# Patient Record
Sex: FEMALE | Race: WHITE | Employment: OTHER | URBAN - METROPOLITAN AREA
[De-identification: names, ages, dates, MRNs, and addresses within clinical notes are randomized per-mention and may not be internally consistent; named-entity substitution may affect disease eponyms.]

---

## 2021-09-11 ENCOUNTER — HOSPITAL ENCOUNTER (EMERGENCY)
Age: 76
Discharge: HOME | End: 2021-09-11
Attending: EMERGENCY MEDICINE | Admitting: EMERGENCY MEDICINE
Payer: MEDICARE

## 2021-09-11 ENCOUNTER — APPOINTMENT (OUTPATIENT)
Dept: CT IMAGING | Age: 76
End: 2021-09-11
Attending: PHYSICIAN ASSISTANT
Payer: MEDICARE

## 2021-09-11 VITALS
TEMPERATURE: 98.1 F | OXYGEN SATURATION: 97 % | RESPIRATION RATE: 16 BRPM | SYSTOLIC BLOOD PRESSURE: 164 MMHG | HEART RATE: 68 BPM | DIASTOLIC BLOOD PRESSURE: 77 MMHG
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
## 2021-09-11 PROCEDURE — 74011250637 HC RX REV CODE- 250/637: Performed by: PHYSICIAN ASSISTANT

## 2021-09-11 PROCEDURE — 99283 EMERGENCY DEPT VISIT LOW MDM: CPT

## 2021-09-11 PROCEDURE — 74011000250 HC RX REV CODE- 250: Performed by: PHYSICIAN ASSISTANT

## 2021-09-11 PROCEDURE — 70486 CT MAXILLOFACIAL W/O DYE: CPT

## 2021-09-11 PROCEDURE — 75810000293 HC SIMP/SUPERF WND  RPR

## 2021-09-11 RX ORDER — BACITRACIN 500 UNIT/G
1 PACKET (EA) TOPICAL
Status: COMPLETED | OUTPATIENT
Start: 2021-09-11 | End: 2021-09-11

## 2021-09-11 RX ORDER — LIDOCAINE HYDROCHLORIDE AND EPINEPHRINE 10; 10 MG/ML; UG/ML
10 INJECTION, SOLUTION INFILTRATION; PERINEURAL ONCE
Status: COMPLETED | OUTPATIENT
Start: 2021-09-11 | End: 2021-09-11

## 2021-09-11 RX ORDER — AMOXICILLIN AND CLAVULANATE POTASSIUM 875; 125 MG/1; MG/1
1 TABLET, FILM COATED ORAL 2 TIMES DAILY
Qty: 14 TABLET | Refills: 0 | Status: SHIPPED | OUTPATIENT
Start: 2021-09-11 | End: 2021-09-18

## 2021-09-11 RX ORDER — IBUPROFEN 400 MG/1
800 TABLET ORAL
Status: COMPLETED | OUTPATIENT
Start: 2021-09-11 | End: 2021-09-11

## 2021-09-11 RX ORDER — AMOXICILLIN AND CLAVULANATE POTASSIUM 875; 125 MG/1; MG/1
1 TABLET, FILM COATED ORAL
Status: COMPLETED | OUTPATIENT
Start: 2021-09-11 | End: 2021-09-11

## 2021-09-11 RX ADMIN — BACITRACIN 1 PACKET: 500 OINTMENT TOPICAL at 13:46

## 2021-09-11 RX ADMIN — LIDOCAINE HYDROCHLORIDE AND EPINEPHRINE 100 MG: 10; 10 INJECTION, SOLUTION INFILTRATION; PERINEURAL at 13:46

## 2021-09-11 RX ADMIN — AMOXICILLIN AND CLAVULANATE POTASSIUM 1 TABLET: 875; 125 TABLET, FILM COATED ORAL at 13:46

## 2021-09-11 RX ADMIN — IBUPROFEN 800 MG: 400 TABLET, FILM COATED ORAL at 15:18

## 2021-09-11 NOTE — ED TRIAGE NOTES
Triage: Pt arrives ambulatory from home with CC of dog bite to her face. Her neighbors dog bit her while she was repairing her fence. The dog is up to date on its rabies vaccination.

## 2021-09-11 NOTE — DISCHARGE INSTRUCTIONS
Return for new or worsening symptoms. Call ENT on Monday to make a follow up appt - explain that you were seen in the ED, you had a dog bite to your face and you have have an injury to your parotid gland. Keep the wound dry for the first 24-48 hours. After that it is okay for the wound to get wet, but do not soak it for extended periods of time. Wash the wound 1-2 times a day with warm water and gentle soap. Apply an antibiotic ointment such as Neosporin or Bacitracin, or plain petroleum jelly (Vaseline). Keep covered until healed if able to. Watch for any signs of infection, including fever/chills, redness, pain, swelling, or drainage from the wound, and seek medical attention if necessary. Sutures should be removed in 5-6 days.

## 2021-09-11 NOTE — ED PROVIDER NOTES
70year old female presenting to the ED for a dog bite. Notes that she was in her yard, repairing her fence, when the next door neighbor's dog lunged over the fence and bit her face. Notes moderately severe pain with palpation at the site of mutliple lacerations. Dog is UTD. No meds PTA. No other concerns. PMHx: hypothyroid  PSx: tubal ligation  Social: non-smoker. Rare alcohol. Retired [employer] . Allergies: keflex  Tetanus: within 5 years    The history is provided by the patient. Dog Bite   Pertinent negatives include no chest pain and no vomiting. No past medical history on file. No past surgical history on file. No family history on file. Social History     Socioeconomic History    Marital status:      Spouse name: Not on file    Number of children: Not on file    Years of education: Not on file    Highest education level: Not on file   Occupational History    Not on file   Tobacco Use    Smoking status: Not on file   Substance and Sexual Activity    Alcohol use: Not on file    Drug use: Not on file    Sexual activity: Not on file   Other Topics Concern    Not on file   Social History Narrative    Not on file     Social Determinants of Health     Financial Resource Strain:     Difficulty of Paying Living Expenses:    Food Insecurity:     Worried About Running Out of Food in the Last Year:     [address] in the Last Year:    Transportation Needs:     Lack of Transportation (Medical):      Lack of Transportation (Non-Medical):    Physical Activity:     Days of Exercise per Week:     Minutes of Exercise per Session:    Stress:     Feeling of Stress :    Social Connections:     Frequency of Communication with Friends and Family:     Frequency of Social Gatherings with Friends and Family:     Attends [employer] orth[employer] Services:     Active Member of Clubs or Organizations:     Attends Club or Organization Meetings:     Marital Status: Intimate Partner Violence:     Fear of Current or Ex-Partner:     Emotionally Abused:     Physically Abused:     Sexually Abused: ALLERGIES: Keflex [cephalexin]    Review of Systems   Constitutional: Negative for fever. HENT: Negative for facial swelling. Respiratory: Negative for shortness of breath. Cardiovascular: Negative for chest pain. Gastrointestinal: Negative for vomiting. Skin: Positive for wound. Neurological: Negative for syncope. All other systems reviewed and are negative. Vitals:    09/11/21 1257   BP: (!) 164/77   Pulse: 68   Resp: 16   Temp: 98.1 °F (36.7 °C)   SpO2: 97%            Physical Exam  Vitals and nursing note reviewed. Constitutional:       General: She is not in acute distress. Appearance: She is well-developed. Comments: Pleasant elderly WF   HENT:      Head: Normocephalic and atraumatic. Comments: 2cm preauricular region, 2.5cm over the mandible, 0.24cm laceration on the ear lobe     Right Ear: External ear normal.      Left Ear: External ear normal.   Eyes:      General: No scleral icterus. Conjunctiva/sclera: Conjunctivae normal.   Neck:      Trachea: No tracheal deviation. Cardiovascular:      Rate and Rhythm: Normal rate and regular rhythm. Heart sounds: Normal heart sounds. No murmur heard. No friction rub. No gallop. Pulmonary:      Effort: Pulmonary effort is normal. No respiratory distress. Breath sounds: Normal breath sounds. No stridor. No wheezing. Abdominal:      General: There is no distension. Palpations: Abdomen is soft. Musculoskeletal:         General: Normal range of motion. Cervical back: Neck supple. Skin:     General: Skin is warm and dry. Neurological:      Mental Status: She is alert and oriented to person, place, and time.    Psychiatric:         Behavior: Behavior normal.          MDM  Number of Diagnoses or Management Options  Dog bite, initial encounter  Diagnosis management comments: 70year old F presenting for dog bite to the face. Neighbor's dog, UTD. Wound copiously irrigated and explored. Given degree of pain, CT ordered, possible injury to the parotid gland noted - discussed with ED attending, agrees with ENT follow up, discussed with patient, voiced understanding of f/u instructions and return precautions. Rx augmentin. Amount and/or Complexity of Data Reviewed  Tests in the radiology section of CPT®: ordered and reviewed  Discuss the patient with other providers: yes (Dr. Johanna Tran and Dr. Mary Segundo, ED attendings)  Independent visualization of images, tracings, or specimens: yes (CT)           Wound Repair    Date/Time: 9/11/2021 2:32 PM  Performed by: Nyasia Calles provider: Dr. Johanna Tran  Preparation: skin prepped with Shur-Clens  Location details: face  Wound length:2.6 - 7.5 cm (total: 5.5)  Anesthesia: local infiltration    Anesthesia:  Local Anesthetic: lidocaine 1% with epinephrine  Anesthetic total: 6 mL  Foreign bodies: no foreign bodies  Irrigation solution: saline  Irrigation method: syringe  Debridement: none  Skin closure: 6-0 nylon  Number of sutures: 5  Technique: simple and interrupted  Approximation: close  Dressing: antibiotic ointment  Patient tolerance: patient tolerated the procedure well with no immediate complications  My total time at bedside, performing this procedure was 16-30 minutes. Comments: 3 sutures placed over mandile, 2 in preauricular area. Wounds irrigated with about 1/2L NS and explored, extend into the subcutaneous fat but not deeper.   Tiny wound on ear lobe already healing, no need for suture placement

## 2022-03-30 ENCOUNTER — HOSPITAL ENCOUNTER (INPATIENT)
Age: 77
LOS: 3 days | Discharge: HOME | DRG: 337 | End: 2022-04-02
Attending: EMERGENCY MEDICINE | Admitting: SURGERY
Payer: MEDICARE

## 2022-03-30 ENCOUNTER — APPOINTMENT (OUTPATIENT)
Dept: CT IMAGING | Age: 77
DRG: 337 | End: 2022-03-30
Attending: EMERGENCY MEDICINE
Payer: MEDICARE

## 2022-03-30 DIAGNOSIS — K56.609 SBO (SMALL BOWEL OBSTRUCTION) (HCC): ICD-10-CM

## 2022-03-30 DIAGNOSIS — K56.609 SMALL BOWEL OBSTRUCTION (HCC): Primary | ICD-10-CM

## 2022-03-30 LAB
ALBUMIN SERPL-MCNC: 3.8 G/DL (ref 3.5–5)
ALBUMIN/GLOB SERPL: 1 {RATIO} (ref 1.1–2.2)
ALP SERPL-CCNC: 55 U/L (ref 45–117)
ALT SERPL-CCNC: 28 U/L (ref 12–78)
ANION GAP SERPL CALC-SCNC: 6 MMOL/L (ref 5–15)
APPEARANCE UR: ABNORMAL
AST SERPL-CCNC: 34 U/L (ref 15–37)
BACTERIA URNS QL MICRO: NEGATIVE /HPF
BASOPHILS # BLD: 0 K/UL (ref 0–0.1)
BASOPHILS NFR BLD: 1 % (ref 0–1)
BILIRUB SERPL-MCNC: 0.5 MG/DL (ref 0.2–1)
BILIRUB UR QL: NEGATIVE
BUN SERPL-MCNC: 17 MG/DL (ref 6–20)
BUN/CREAT SERPL: 22 (ref 12–20)
CALCIUM SERPL-MCNC: 9 MG/DL (ref 8.5–10.1)
CHLORIDE SERPL-SCNC: 102 MMOL/L (ref 97–108)
CO2 SERPL-SCNC: 27 MMOL/L (ref 21–32)
COLOR UR: ABNORMAL
COMMENT, HOLDF: NORMAL
CREAT SERPL-MCNC: 0.77 MG/DL (ref 0.55–1.02)
DIFFERENTIAL METHOD BLD: NORMAL
EOSINOPHIL # BLD: 0.1 K/UL (ref 0–0.4)
EOSINOPHIL NFR BLD: 2 % (ref 0–7)
EPITH CASTS URNS QL MICRO: ABNORMAL /LPF
ERYTHROCYTE [DISTWIDTH] IN BLOOD BY AUTOMATED COUNT: 13 % (ref 11.5–14.5)
GLOBULIN SER CALC-MCNC: 3.7 G/DL (ref 2–4)
GLUCOSE SERPL-MCNC: 135 MG/DL (ref 65–100)
GLUCOSE UR STRIP.AUTO-MCNC: NEGATIVE MG/DL
HCT VFR BLD AUTO: 40.6 % (ref 35–47)
HGB BLD-MCNC: 13.5 G/DL (ref 11.5–16)
HGB UR QL STRIP: NEGATIVE
HYALINE CASTS URNS QL MICRO: ABNORMAL /LPF (ref 0–5)
IMM GRANULOCYTES # BLD AUTO: 0 K/UL (ref 0–0.04)
IMM GRANULOCYTES NFR BLD AUTO: 0 % (ref 0–0.5)
KETONES UR QL STRIP.AUTO: NEGATIVE MG/DL
LEUKOCYTE ESTERASE UR QL STRIP.AUTO: NEGATIVE
LYMPHOCYTES # BLD: 1.4 K/UL (ref 0.8–3.5)
LYMPHOCYTES NFR BLD: 30 % (ref 12–49)
MCH RBC QN AUTO: 31.5 PG (ref 26–34)
MCHC RBC AUTO-ENTMCNC: 33.3 G/DL (ref 30–36.5)
MCV RBC AUTO: 94.9 FL (ref 80–99)
MONOCYTES # BLD: 0.5 K/UL (ref 0–1)
MONOCYTES NFR BLD: 11 % (ref 5–13)
NEUTS SEG # BLD: 2.6 K/UL (ref 1.8–8)
NEUTS SEG NFR BLD: 56 % (ref 32–75)
NITRITE UR QL STRIP.AUTO: NEGATIVE
NRBC # BLD: 0 K/UL (ref 0–0.01)
NRBC BLD-RTO: 0 PER 100 WBC
PH UR STRIP: 8 [PH] (ref 5–8)
PLATELET # BLD AUTO: 328 K/UL (ref 150–400)
PMV BLD AUTO: 9.8 FL (ref 8.9–12.9)
POTASSIUM SERPL-SCNC: 4.3 MMOL/L (ref 3.5–5.1)
PROT SERPL-MCNC: 7.5 G/DL (ref 6.4–8.2)
PROT UR STRIP-MCNC: NEGATIVE MG/DL
RBC # BLD AUTO: 4.28 M/UL (ref 3.8–5.2)
RBC #/AREA URNS HPF: ABNORMAL /HPF (ref 0–5)
SAMPLES BEING HELD,HOLD: NORMAL
SODIUM SERPL-SCNC: 135 MMOL/L (ref 136–145)
SP GR UR REFRACTOMETRY: 1.02 (ref 1–1.03)
UR CULT HOLD, URHOLD: NORMAL
UROBILINOGEN UR QL STRIP.AUTO: 0.2 EU/DL (ref 0.2–1)
WBC # BLD AUTO: 4.6 K/UL (ref 3.6–11)
WBC URNS QL MICRO: ABNORMAL /HPF (ref 0–4)

## 2022-03-30 PROCEDURE — 81001 URINALYSIS AUTO W/SCOPE: CPT

## 2022-03-30 PROCEDURE — 96374 THER/PROPH/DIAG INJ IV PUSH: CPT

## 2022-03-30 PROCEDURE — 74011000250 HC RX REV CODE- 250: Performed by: EMERGENCY MEDICINE

## 2022-03-30 PROCEDURE — C9113 INJ PANTOPRAZOLE SODIUM, VIA: HCPCS | Performed by: NURSE PRACTITIONER

## 2022-03-30 PROCEDURE — 65270000029 HC RM PRIVATE

## 2022-03-30 PROCEDURE — 36415 COLL VENOUS BLD VENIPUNCTURE: CPT

## 2022-03-30 PROCEDURE — 85025 COMPLETE CBC W/AUTO DIFF WBC: CPT

## 2022-03-30 PROCEDURE — 80053 COMPREHEN METABOLIC PANEL: CPT

## 2022-03-30 PROCEDURE — 74176 CT ABD & PELVIS W/O CONTRAST: CPT

## 2022-03-30 PROCEDURE — 74011000250 HC RX REV CODE- 250: Performed by: NURSE PRACTITIONER

## 2022-03-30 PROCEDURE — 74011250636 HC RX REV CODE- 250/636: Performed by: NURSE PRACTITIONER

## 2022-03-30 PROCEDURE — 96375 TX/PRO/DX INJ NEW DRUG ADDON: CPT

## 2022-03-30 PROCEDURE — 99221 1ST HOSP IP/OBS SF/LOW 40: CPT | Performed by: NURSE PRACTITIONER

## 2022-03-30 PROCEDURE — 74011250636 HC RX REV CODE- 250/636: Performed by: EMERGENCY MEDICINE

## 2022-03-30 PROCEDURE — 99285 EMERGENCY DEPT VISIT HI MDM: CPT

## 2022-03-30 RX ORDER — SODIUM CHLORIDE 0.9 % (FLUSH) 0.9 %
5-40 SYRINGE (ML) INJECTION EVERY 8 HOURS
Status: DISCONTINUED | OUTPATIENT
Start: 2022-03-30 | End: 2022-03-30

## 2022-03-30 RX ORDER — PANTOPRAZOLE SODIUM 40 MG/10ML
40 INJECTION, POWDER, LYOPHILIZED, FOR SOLUTION INTRAVENOUS DAILY
Status: DISCONTINUED | OUTPATIENT
Start: 2022-03-30 | End: 2022-04-02 | Stop reason: HOSPADM

## 2022-03-30 RX ORDER — SODIUM CHLORIDE 0.9 % (FLUSH) 0.9 %
5-40 SYRINGE (ML) INJECTION AS NEEDED
Status: DISCONTINUED | OUTPATIENT
Start: 2022-03-30 | End: 2022-03-30

## 2022-03-30 RX ORDER — KETOROLAC TROMETHAMINE 30 MG/ML
15 INJECTION, SOLUTION INTRAMUSCULAR; INTRAVENOUS
Status: COMPLETED | OUTPATIENT
Start: 2022-03-30 | End: 2022-03-30

## 2022-03-30 RX ORDER — SODIUM CHLORIDE 0.9 % (FLUSH) 0.9 %
10 SYRINGE (ML) INJECTION DAILY
Status: DISCONTINUED | OUTPATIENT
Start: 2022-03-30 | End: 2022-04-02 | Stop reason: HOSPADM

## 2022-03-30 RX ORDER — ONDANSETRON 2 MG/ML
4 INJECTION INTRAMUSCULAR; INTRAVENOUS
Status: COMPLETED | OUTPATIENT
Start: 2022-03-30 | End: 2022-03-30

## 2022-03-30 RX ORDER — HYDROMORPHONE HYDROCHLORIDE 1 MG/ML
0.5 INJECTION, SOLUTION INTRAMUSCULAR; INTRAVENOUS; SUBCUTANEOUS
Status: DISCONTINUED | OUTPATIENT
Start: 2022-03-30 | End: 2022-03-31

## 2022-03-30 RX ORDER — SODIUM CHLORIDE 9 MG/ML
125 INJECTION, SOLUTION INTRAVENOUS CONTINUOUS
Status: DISCONTINUED | OUTPATIENT
Start: 2022-03-30 | End: 2022-04-01

## 2022-03-30 RX ORDER — ONDANSETRON 2 MG/ML
4 INJECTION INTRAMUSCULAR; INTRAVENOUS
Status: DISCONTINUED | OUTPATIENT
Start: 2022-03-30 | End: 2022-04-02 | Stop reason: HOSPADM

## 2022-03-30 RX ORDER — LEVOTHYROXINE SODIUM 112 UG/1
112 TABLET ORAL DAILY
COMMUNITY

## 2022-03-30 RX ADMIN — KETOROLAC TROMETHAMINE 15 MG: 30 INJECTION, SOLUTION INTRAMUSCULAR; INTRAVENOUS at 11:23

## 2022-03-30 RX ADMIN — HYDROMORPHONE HYDROCHLORIDE 0.5 MG: 1 INJECTION, SOLUTION INTRAMUSCULAR; INTRAVENOUS; SUBCUTANEOUS at 23:39

## 2022-03-30 RX ADMIN — HYDROMORPHONE HYDROCHLORIDE 0.5 MG: 1 INJECTION, SOLUTION INTRAMUSCULAR; INTRAVENOUS; SUBCUTANEOUS at 19:07

## 2022-03-30 RX ADMIN — SODIUM CHLORIDE, PRESERVATIVE FREE 10 ML: 5 INJECTION INTRAVENOUS at 15:18

## 2022-03-30 RX ADMIN — HYDROMORPHONE HYDROCHLORIDE 0.5 MG: 1 INJECTION, SOLUTION INTRAMUSCULAR; INTRAVENOUS; SUBCUTANEOUS at 15:07

## 2022-03-30 RX ADMIN — ONDANSETRON 4 MG: 2 INJECTION INTRAMUSCULAR; INTRAVENOUS at 11:23

## 2022-03-30 RX ADMIN — SODIUM CHLORIDE, PRESERVATIVE FREE 10 ML: 5 INJECTION INTRAVENOUS at 15:07

## 2022-03-30 RX ADMIN — SODIUM CHLORIDE 1000 ML: 9 INJECTION, SOLUTION INTRAVENOUS at 11:23

## 2022-03-30 RX ADMIN — ONDANSETRON 4 MG: 2 INJECTION INTRAMUSCULAR; INTRAVENOUS at 15:16

## 2022-03-30 RX ADMIN — SODIUM CHLORIDE 125 ML/HR: 9 INJECTION, SOLUTION INTRAVENOUS at 17:21

## 2022-03-30 RX ADMIN — PANTOPRAZOLE SODIUM 40 MG: 40 INJECTION, POWDER, FOR SOLUTION INTRAVENOUS at 15:16

## 2022-03-30 RX ADMIN — ONDANSETRON 4 MG: 2 INJECTION INTRAMUSCULAR; INTRAVENOUS at 19:07

## 2022-03-30 NOTE — ED TRIAGE NOTES
PT reports LLQ pain that now radiates to her back and her upper abdomen x1 hour. Pt also reports nausea. Pt denies v/d. Pt took some paulo seltzer with no relief.

## 2022-03-30 NOTE — CONSULTS
General Surgery ER Consultation    Admit Date: 3/30/2022  Reason for Consultation: SBO    HPI:  Roz Red is a 78 y.o. female w/ surgical hx of tubal ligation presents to the ED w/ c/o sudden onset of LLQ abd pain that started early this am a/w nausea. The pain radiates through to her back and under her ribs on L side. No vomiting. No fevers. She tried paulo seltzer and it didn't help. CT shows   localized small bowel distention in the left lower quadrant is concerning for  focal small bowel obstruction possibly related to adhesion or internal hernia. Patient Active Problem List    Diagnosis Date Noted    SBO (small bowel obstruction) ([facility] [address] 03/30/2022     Past Medical History:   Diagnosis Date    Hypothyroidism       Past Surgical History:   Procedure Laterality Date    HX TUBAL LIGATION        Social History     Tobacco Use    Smoking status: Never Smoker    Smokeless tobacco: Never Used   Substance Use Topics    Alcohol use: Not Currently      History reviewed. No pertinent family history. Prior to Admission medications    Not on File     Allergies   Allergen Reactions    Keflex [Cephalexin] Other (comments)     Shaking            Subjective:     Review of Systems:    A comprehensive review of systems was negative except for that written in the History of Present Illness. Objective:     Blood pressure (!) 156/75, pulse 61, temperature 98.5 °F (36.9 °C), resp. rate 18, SpO2 96 %.   Recent Results (from the past 24 hour(s))   CBC WITH AUTOMATED DIFF    Collection Time: 03/30/22 10:23 AM   Result Value Ref Range    WBC 4.6 3.6 - 11.0 K/uL    RBC 4.28 3.80 - 5.20 M/uL    HGB 13.5 11.5 - 16.0 g/dL    HCT 40.6 35.0 - 47.0 %    MCV 94.9 80.0 - 99.0 FL    MCH 31.5 26.0 - 34.0 PG    MCHC 33.3 30.0 - 36.5 g/dL    RDW 13.0 11.5 - 14.5 %    PLATELET 879 495 - 396 K/uL    MPV 9.8 8.9 - 12.9 FL    NRBC 0.0 0  WBC    ABSOLUTE NRBC 0.00 0.00 - 0.01 K/uL    NEUTROPHILS 56 32 - 75 %    LYMPHOCYTES 30 12 - 49 %    MONOCYTES 11 5 - 13 %    EOSINOPHILS 2 0 - 7 %    BASOPHILS 1 0 - 1 %    IMMATURE GRANULOCYTES 0 0.0 - 0.5 %    ABS. NEUTROPHILS 2.6 1.8 - 8.0 K/UL    ABS. LYMPHOCYTES 1.4 0.8 - 3.5 K/UL    ABS. MONOCYTES 0.5 0.0 - 1.0 K/UL    ABS. EOSINOPHILS 0.1 0.0 - 0.4 K/UL    ABS. BASOPHILS 0.0 0.0 - 0.1 K/UL    ABS. IMM. GRANS. 0.0 0.00 - 0.04 K/UL    DF AUTOMATED     METABOLIC PANEL, COMPREHENSIVE    Collection Time: 03/30/22 10:23 AM   Result Value Ref Range    Sodium 135 (L) 136 - 145 mmol/L    Potassium 4.3 3.5 - 5.1 mmol/L    Chloride 102 97 - 108 mmol/L    CO2 27 21 - 32 mmol/L    Anion gap 6 5 - 15 mmol/L    Glucose 135 (H) 65 - 100 mg/dL    BUN 17 6 - 20 MG/DL    Creatinine 0.77 0.55 - 1.02 MG/DL    BUN/Creatinine ratio 22 (H) 12 - 20      GFR est AA >60 >60 ml/min/1.73m2    GFR est non-AA >60 >60 ml/min/1.73m2    Calcium 9.0 8.5 - 10.1 MG/DL    Bilirubin, total 0.5 0.2 - 1.0 MG/DL    ALT (SGPT) 28 12 - 78 U/L    AST (SGOT) 34 15 - 37 U/L    Alk. phosphatase 55 45 - 117 U/L    Protein, total 7.5 6.4 - 8.2 g/dL    Albumin 3.8 3.5 - 5.0 g/dL    Globulin 3.7 2.0 - 4.0 g/dL    A-G Ratio 1.0 (L) 1.1 - 2.2     SAMPLES BEING HELD    Collection Time: 03/30/22 10:23 AM   Result Value Ref Range    SAMPLES BEING HELD 1RED     COMMENT        Add-on orders for these samples will be processed based on acceptable specimen integrity and analyte stability, which may vary by analyte.    URINALYSIS W/MICROSCOPIC    Collection Time: 03/30/22 12:31 PM   Result Value Ref Range    Color YELLOW/STRAW      Appearance TURBID (A) CLEAR      Specific gravity 1.018 1.003 - 1.030      pH (UA) 8.0 5.0 - 8.0      Protein Negative NEG mg/dL    Glucose Negative NEG mg/dL    Ketone Negative NEG mg/dL    Bilirubin Negative NEG      Blood Negative NEG      Urobilinogen 0.2 0.2 - 1.0 EU/dL    Nitrites Negative NEG      Leukocyte Esterase Negative NEG      WBC 0-4 0 - 4 /hpf    RBC 0-5 0 - 5 /hpf    Epithelial cells FEW FEW /lpf    Bacteria Negative NEG /hpf    Hyaline cast 0-2 0 - 5 /lpf   URINE CULTURE HOLD SAMPLE    Collection Time: 03/30/22 12:31 PM    Specimen: Serum; Urine   Result Value Ref Range    Urine culture hold        Urine on hold in Microbiology dept for 2 days. If unpreserved urine is submitted, it cannot be used for addtional testing after 24 hours, recollection will be required.     _____________________  Physical Exam:     General:  Alert, cooperative, no distress, appears stated age. Eyes:   Sclera clear. Throat: Lips, mucosa, and tongue normal.   Neck: Supple, symmetrical, trachea midline. Lungs:   Clear to auscultation bilaterally. Heart:  Regular rate and rhythm. Abdomen:   Soft, TTP LLQ, Bowel sounds hypoactive. No masses,  No organomegaly. Extremities: Extremities normal, atraumatic, no cyanosis or edema. Skin: Skin color, texture, turgor normal. No rashes or lesions. Assessment:   Active Problems:    SBO (small bowel obstruction) ([address] (3/30/2022)            Plan:     Admit to Dr Jona Hunt for SBO  IVF  Bowel rest, npo w/ ice chips  Pain and nausea mgmt  PPI  AXR tomorrow    Total time spent with patient: 30 minutes.     Signed By: Tereso Macias NP     March 30, 2022

## 2022-03-30 NOTE — PROGRESS NOTES
Problem: General Medical Care Plan  Goal: *Vital signs within specified parameters  3/30/2022 1734 by Helen Dimas RN  Outcome: Progressing Towards Goal  3/30/2022 1709 by Helen Dimas RN  Outcome: Progressing Towards Goal  Goal: *Labs within defined limits  3/30/2022 1734 by Helen Dimas RN  Outcome: Progressing Towards Goal  3/30/2022 1709 by Helen Dimas RN  Outcome: Progressing Towards Goal  Goal: *Absence of infection signs and symptoms  3/30/2022 1734 by Helen Dimas RN  Outcome: Progressing Towards Goal  3/30/2022 1709 by Helen Dimas RN  Outcome: Progressing Towards Goal  Goal: *Optimal pain control at patient's stated goal  3/30/2022 1734 by Helen Dimas RN  Outcome: Progressing Towards Goal  3/30/2022 1709 by Helen Dimas RN  Outcome: Progressing Towards Goal  Goal: *Skin integrity maintained  3/30/2022 1734 by Helen Dimas RN  Outcome: Progressing Towards Goal  3/30/2022 1709 by Helen Dimas RN  Outcome: Progressing Towards Goal  Goal: *Fluid volume balance  3/30/2022 1734 by Helen Dimas RN  Outcome: Progressing Towards Goal  3/30/2022 1709 by Helen Dimas RN  Outcome: Progressing Towards Goal  Goal: *Optimize nutritional status  3/30/2022 1734 by Helen Dimas RN  Outcome: Progressing Towards Goal  3/30/2022 1709 by Helen Dimas RN  Outcome: Progressing Towards Goal  Goal: *Anxiety reduced or absent  3/30/2022 1734 by Helen Dimas RN  Outcome: Progressing Towards Goal  3/30/2022 1709 by Helen Dimas RN  Outcome: Progressing Towards Goal  Goal: *Progressive mobility and function (eg: ADL's)  3/30/2022 1734 by Helen Dimas RN  Outcome: Progressing Towards Goal  3/30/2022 1709 by Helen Dimas RN  Outcome: Progressing Towards Goal

## 2022-03-30 NOTE — H&P
General Surgery H and P     Admit Date: 3/30/2022  Reason for Consultation: SBO    HPI:  Kenzie Finley is a 78 y.o. female w/ surgical hx of tubal ligation presents to the ED w/ c/o sudden onset of LLQ abd pain that started early this am a/w nausea. The pain radiates through to her back and under her ribs on L side. No vomiting. No fevers. She tried paulo seltzer and it didn't help. CT shows   localized small bowel distention in the left lower quadrant is concerning for  focal small bowel obstruction possibly related to adhesion or internal hernia. Patient Active Problem List    Diagnosis Date Noted    SBO (small bowel obstruction) ([facility] [address] 03/30/2022     Past Medical History:   Diagnosis Date    Hypothyroidism       Past Surgical History:   Procedure Laterality Date    HX TUBAL LIGATION        Social History     Tobacco Use    Smoking status: Never Smoker    Smokeless tobacco: Never Used   Substance Use Topics    Alcohol use: Not Currently      History reviewed. No pertinent family history. Prior to Admission medications    Not on File     Allergies   Allergen Reactions    Keflex [Cephalexin] Other (comments)     Shaking            Subjective:     Review of Systems:    A comprehensive review of systems was negative except for that written in the History of Present Illness. Objective:     Blood pressure (!) 156/75, pulse 61, temperature 98.5 °F (36.9 °C), resp. rate 18, SpO2 96 %.   Recent Results (from the past 24 hour(s))   CBC WITH AUTOMATED DIFF    Collection Time: 03/30/22 10:23 AM   Result Value Ref Range    WBC 4.6 3.6 - 11.0 K/uL    RBC 4.28 3.80 - 5.20 M/uL    HGB 13.5 11.5 - 16.0 g/dL    HCT 40.6 35.0 - 47.0 %    MCV 94.9 80.0 - 99.0 FL    MCH 31.5 26.0 - 34.0 PG    MCHC 33.3 30.0 - 36.5 g/dL    RDW 13.0 11.5 - 14.5 %    PLATELET 637 034 - 955 K/uL    MPV 9.8 8.9 - 12.9 FL    NRBC 0.0 0  WBC    ABSOLUTE NRBC 0.00 0.00 - 0.01 K/uL    NEUTROPHILS 56 32 - 75 % LYMPHOCYTES 30 12 - 49 %    MONOCYTES 11 5 - 13 %    EOSINOPHILS 2 0 - 7 %    BASOPHILS 1 0 - 1 %    IMMATURE GRANULOCYTES 0 0.0 - 0.5 %    ABS. NEUTROPHILS 2.6 1.8 - 8.0 K/UL    ABS. LYMPHOCYTES 1.4 0.8 - 3.5 K/UL    ABS. MONOCYTES 0.5 0.0 - 1.0 K/UL    ABS. EOSINOPHILS 0.1 0.0 - 0.4 K/UL    ABS. BASOPHILS 0.0 0.0 - 0.1 K/UL    ABS. IMM. GRANS. 0.0 0.00 - 0.04 K/UL    DF AUTOMATED     METABOLIC PANEL, COMPREHENSIVE    Collection Time: 03/30/22 10:23 AM   Result Value Ref Range    Sodium 135 (L) 136 - 145 mmol/L    Potassium 4.3 3.5 - 5.1 mmol/L    Chloride 102 97 - 108 mmol/L    CO2 27 21 - 32 mmol/L    Anion gap 6 5 - 15 mmol/L    Glucose 135 (H) 65 - 100 mg/dL    BUN 17 6 - 20 MG/DL    Creatinine 0.77 0.55 - 1.02 MG/DL    BUN/Creatinine ratio 22 (H) 12 - 20      GFR est AA >60 >60 ml/min/1.73m2    GFR est non-AA >60 >60 ml/min/1.73m2    Calcium 9.0 8.5 - 10.1 MG/DL    Bilirubin, total 0.5 0.2 - 1.0 MG/DL    ALT (SGPT) 28 12 - 78 U/L    AST (SGOT) 34 15 - 37 U/L    Alk. phosphatase 55 45 - 117 U/L    Protein, total 7.5 6.4 - 8.2 g/dL    Albumin 3.8 3.5 - 5.0 g/dL    Globulin 3.7 2.0 - 4.0 g/dL    A-G Ratio 1.0 (L) 1.1 - 2.2     SAMPLES BEING HELD    Collection Time: 03/30/22 10:23 AM   Result Value Ref Range    SAMPLES BEING HELD 1RED     COMMENT        Add-on orders for these samples will be processed based on acceptable specimen integrity and analyte stability, which may vary by analyte.    URINALYSIS W/MICROSCOPIC    Collection Time: 03/30/22 12:31 PM   Result Value Ref Range    Color YELLOW/STRAW      Appearance TURBID (A) CLEAR      Specific gravity 1.018 1.003 - 1.030      pH (UA) 8.0 5.0 - 8.0      Protein Negative NEG mg/dL    Glucose Negative NEG mg/dL    Ketone Negative NEG mg/dL    Bilirubin Negative NEG      Blood Negative NEG      Urobilinogen 0.2 0.2 - 1.0 EU/dL    Nitrites Negative NEG      Leukocyte Esterase Negative NEG      WBC 0-4 0 - 4 /hpf    RBC 0-5 0 - 5 /hpf    Epithelial cells FEW FEW /lpf Bacteria Negative NEG /hpf    Hyaline cast 0-2 0 - 5 /lpf   URINE CULTURE HOLD SAMPLE    Collection Time: 03/30/22 12:31 PM    Specimen: Serum; Urine   Result Value Ref Range    Urine culture hold        Urine on hold in Microbiology dept for 2 days. If unpreserved urine is submitted, it cannot be used for addtional testing after 24 hours, recollection will be required.     _____________________  Physical Exam:     General:  Alert, cooperative, no distress, appears stated age. Eyes:   Sclera clear. Throat: Lips, mucosa, and tongue normal.   Neck: Supple, symmetrical, trachea midline. Lungs:   Clear to auscultation bilaterally. Heart:  Regular rate and rhythm. Abdomen:   Soft, TTP LLQ, Bowel sounds hypoactive. No masses,  No organomegaly. Extremities: Extremities normal, atraumatic, no cyanosis or edema. Skin: Skin color, texture, turgor normal. No rashes or lesions. Assessment:   Active Problems:    SBO (small bowel obstruction) ([address] (3/30/2022)            Plan:     Admit to Dr Sawyer Verdugo for SBO  IVF  Bowel rest, npo w/ ice chips  Pain and nausea mgmt  PPI  AXR tomorrow    Total time spent with patient: 30 minutes. Signed By: Jean Kim NP     March 30, 2022      Patient seen and examined  Agree with above  Sudden onset left lower quadrant abdominal pain. Only previous surgical history is tubal ligation. States the pain moves up to her left ribs. Does not recall eating anything differently. General alert no acute distress  Lungs clear bilaterally  Heart regular rate and rhythm  Abdomen soft mild tenderness left lower quadrant no rebound  Labs normal  CT possible small bowel obstruction  Assessment  Possible small bowel obstruction though also seems like it could just be a focal enteritis. We will treat conservatively for now with n.p.o. IV fluids. Does not require NG tube. Repeat abdominal x-ray in a.m.

## 2022-03-30 NOTE — ED NOTES
Has a final transfer review been performed? Yes    Reason for Admission: small bowel obstruction  Patient comes from: home  Mental Status: Alert and oriented  ADL:self care  Ambulation:no difficulty  Pertinent Info/Safety Concerns: pain meds assess for dizziness   COVID Status: Not Tested  MEWS Score: 1    Vitals:    03/30/22 1014 03/30/22 1505 03/30/22 1544   BP: (!) 156/75 (!) 140/65 120/65   Pulse: 61 (!) 58 (!) 59   Resp: 18 20 18   Temp: 98.5 °F (36.9 °C) 97.1 °F (36.2 °C) 97.3 °F (36.3 °C)   SpO2: 96% 96% 95%     Transport mode:Wheelchair    TRANSFER - OUT REPORT:      Lines:   Peripheral IV 03/30/22 Right Antecubital (Active)   Site Assessment Clean, dry, & intact 03/30/22 1042   Phlebitis Assessment 0 03/30/22 1042   Infiltration Assessment 0 03/30/22 1042   Dressing Status Clean, dry, & intact 03/30/22 1042   Dressing Type Transparent 03/30/22 1042   Hub Color/Line Status Pink 03/30/22 1042   Action Taken Blood drawn 03/30/22 1042   Alcohol Cap Used No 03/30/22 1042        Opportunity for questions and clarification was provided.

## 2022-03-30 NOTE — ED PROVIDER NOTES
History of hypothyroidism. She presents with complaints of a 1 hour history of left lower quadrant pain that radiates to her left flank. It began suddenly. She rates it as 8 out of 10. She has had nausea but no vomiting. No diarrhea. She has been moving her bowels normally. She has had no urinary symptoms. She felt fine prior to this pain developing. No exacerbating or relieving factors. No history of kidney stones. Past Medical History:   Diagnosis Date    Hypothyroidism        Past Surgical History:   Procedure Laterality Date    HX TUBAL LIGATION           History reviewed. No pertinent family history. Social History     Socioeconomic History    Marital status:      Spouse name: Not on file    Number of children: Not on file    Years of education: Not on file    Highest education level: Not on file   Occupational History    Not on file   Tobacco Use    Smoking status: Never Smoker    Smokeless tobacco: Never Used   Substance and Sexual Activity    Alcohol use: Not Currently    Drug use: Not Currently    Sexual activity: Not on file   Other Topics Concern    Not on file   Social History Narrative    Not on file     Social Determinants of Health     Financial Resource Strain:     Difficulty of Paying Living Expenses: Not on file   Food Insecurity:     Worried About Running Out of Food in the Last Year: Not on file    Karlie of Food in the Last Year: Not on file   Transportation Needs:     Lack of Transportation (Medical): Not on file    Lack of Transportation (Non-Medical):  Not on file   Physical Activity:     Days of Exercise per Week: Not on file    Minutes of Exercise per Session: Not on file   Stress:     Feeling of Stress : Not on file   Social Connections:     Frequency of Communication with Friends and Family: Not on file    Frequency of Social Gatherings with Friends and Family: Not on file    Attends Sabianism Services: Not on file    Active Member of Clubs or Organizations: Not on file    Attends Club or Organization Meetings: Not on file    Marital Status: Not on file   Intimate Partner Violence:     Fear of Current or Ex-Partner: Not on file    Emotionally Abused: Not on file    Physically Abused: Not on file    Sexually Abused: Not on file   Housing Stability:     Unable to Pay for Housing in the Last Year: Not on file    Number of [place] in the Last Year: Not on file    Unstable Housing in the Last Year: Not on file         ALLERGIES: Keflex [cephalexin]    Review of Systems   All other systems reviewed and are negative. Vitals:    03/30/22 1014   BP: (!) 156/75   Pulse: 61   Resp: 18   Temp: 98.5 °F (36.9 °C)   SpO2: 96%            Physical Exam  Vitals and nursing note reviewed. Constitutional:       Appearance: She is well-developed. Comments: Appears uncomfortable. HENT:      Head: Normocephalic and atraumatic. Eyes:      Conjunctiva/sclera: Conjunctivae normal.   Neck:      Trachea: No tracheal deviation. Cardiovascular:      Rate and Rhythm: Normal rate and regular rhythm. Heart sounds: Normal heart sounds. No murmur heard. No friction rub. No gallop. Pulmonary:      Effort: Pulmonary effort is normal.      Breath sounds: Normal breath sounds. Abdominal:      Palpations: Abdomen is soft. Tenderness: There is abdominal tenderness in the left lower quadrant. Musculoskeletal:         General: No deformity. Cervical back: Neck supple. Skin:     General: Skin is warm and dry. Neurological:      Mental Status: She is alert. Comments: oriented          MDM       Procedures    Consult note: Dr. Nan Serrato (general surgery) -will admit. Malu Heredia MD    Assessment/plan: Small bowel obstruction -admit to surgery.   Malu Heredia MD  1:59 PM

## 2022-03-31 ENCOUNTER — APPOINTMENT (OUTPATIENT)
Dept: GENERAL RADIOLOGY | Age: 77
DRG: 337 | End: 2022-03-31
Attending: NURSE PRACTITIONER
Payer: MEDICARE

## 2022-03-31 ENCOUNTER — ANESTHESIA (OUTPATIENT)
Dept: SURGERY | Age: 77
DRG: 337 | End: 2022-03-31
Payer: MEDICARE

## 2022-03-31 ENCOUNTER — ANESTHESIA EVENT (OUTPATIENT)
Dept: SURGERY | Age: 77
DRG: 337 | End: 2022-03-31
Payer: MEDICARE

## 2022-03-31 LAB
ANION GAP SERPL CALC-SCNC: 7 MMOL/L (ref 5–15)
BASOPHILS # BLD: 0 K/UL (ref 0–0.1)
BASOPHILS NFR BLD: 0 % (ref 0–1)
BUN SERPL-MCNC: 10 MG/DL (ref 6–20)
BUN/CREAT SERPL: 16 (ref 12–20)
CALCIUM SERPL-MCNC: 8.7 MG/DL (ref 8.5–10.1)
CHLORIDE SERPL-SCNC: 105 MMOL/L (ref 97–108)
CO2 SERPL-SCNC: 24 MMOL/L (ref 21–32)
CREAT SERPL-MCNC: 0.63 MG/DL (ref 0.55–1.02)
DIFFERENTIAL METHOD BLD: ABNORMAL
EOSINOPHIL # BLD: 0 K/UL (ref 0–0.4)
EOSINOPHIL NFR BLD: 0 % (ref 0–7)
ERYTHROCYTE [DISTWIDTH] IN BLOOD BY AUTOMATED COUNT: 12.9 % (ref 11.5–14.5)
GLUCOSE SERPL-MCNC: 103 MG/DL (ref 65–100)
HCT VFR BLD AUTO: 39.4 % (ref 35–47)
HGB BLD-MCNC: 13.2 G/DL (ref 11.5–16)
IMM GRANULOCYTES # BLD AUTO: 0 K/UL (ref 0–0.04)
IMM GRANULOCYTES NFR BLD AUTO: 0 % (ref 0–0.5)
LYMPHOCYTES # BLD: 0.5 K/UL (ref 0.8–3.5)
LYMPHOCYTES NFR BLD: 7 % (ref 12–49)
MCH RBC QN AUTO: 31.8 PG (ref 26–34)
MCHC RBC AUTO-ENTMCNC: 33.5 G/DL (ref 30–36.5)
MCV RBC AUTO: 94.9 FL (ref 80–99)
MONOCYTES # BLD: 0.2 K/UL (ref 0–1)
MONOCYTES NFR BLD: 3 % (ref 5–13)
NEUTS SEG # BLD: 6.2 K/UL (ref 1.8–8)
NEUTS SEG NFR BLD: 90 % (ref 32–75)
NRBC # BLD: 0 K/UL (ref 0–0.01)
NRBC BLD-RTO: 0 PER 100 WBC
PLATELET # BLD AUTO: 315 K/UL (ref 150–400)
PMV BLD AUTO: 9.6 FL (ref 8.9–12.9)
POTASSIUM SERPL-SCNC: 3.9 MMOL/L (ref 3.5–5.1)
RBC # BLD AUTO: 4.15 M/UL (ref 3.8–5.2)
RBC MORPH BLD: ABNORMAL
SODIUM SERPL-SCNC: 136 MMOL/L (ref 136–145)
WBC # BLD AUTO: 6.9 K/UL (ref 3.6–11)

## 2022-03-31 PROCEDURE — 65270000029 HC RM PRIVATE

## 2022-03-31 PROCEDURE — 77030008771 HC TU NG SALEM SUMP -A: Performed by: ANESTHESIOLOGY

## 2022-03-31 PROCEDURE — 74011250636 HC RX REV CODE- 250/636: Performed by: NURSE ANESTHETIST, CERTIFIED REGISTERED

## 2022-03-31 PROCEDURE — 77030008606 HC TRCR ENDOSC KII AMR -B: Performed by: SURGERY

## 2022-03-31 PROCEDURE — 77030020829: Performed by: SURGERY

## 2022-03-31 PROCEDURE — 74011250636 HC RX REV CODE- 250/636: Performed by: NURSE PRACTITIONER

## 2022-03-31 PROCEDURE — 74011250636 HC RX REV CODE- 250/636: Performed by: SURGERY

## 2022-03-31 PROCEDURE — 85025 COMPLETE CBC W/AUTO DIFF WBC: CPT

## 2022-03-31 PROCEDURE — 77030008756 HC TU IRR SUC STRY -B: Performed by: SURGERY

## 2022-03-31 PROCEDURE — 77030002933 HC SUT MCRYL J&J -A: Performed by: SURGERY

## 2022-03-31 PROCEDURE — 76010000149 HC OR TIME 1 TO 1.5 HR: Performed by: SURGERY

## 2022-03-31 PROCEDURE — 74018 RADEX ABDOMEN 1 VIEW: CPT

## 2022-03-31 PROCEDURE — 74011000250 HC RX REV CODE- 250: Performed by: NURSE ANESTHETIST, CERTIFIED REGISTERED

## 2022-03-31 PROCEDURE — 99232 SBSQ HOSP IP/OBS MODERATE 35: CPT | Performed by: SURGERY

## 2022-03-31 PROCEDURE — 74011000250 HC RX REV CODE- 250: Performed by: NURSE PRACTITIONER

## 2022-03-31 PROCEDURE — 77030008684 HC TU ET CUF COVD -B: Performed by: ANESTHESIOLOGY

## 2022-03-31 PROCEDURE — 77030026438 HC STYL ET INTUB CARD -A: Performed by: ANESTHESIOLOGY

## 2022-03-31 PROCEDURE — 80048 BASIC METABOLIC PNL TOTAL CA: CPT

## 2022-03-31 PROCEDURE — 77030008608 HC TRCR ENDOSC SMTH AMR -B: Performed by: SURGERY

## 2022-03-31 PROCEDURE — 76210000006 HC OR PH I REC 0.5 TO 1 HR: Performed by: SURGERY

## 2022-03-31 PROCEDURE — 77030040361 HC SLV COMPR DVT MDII -B: Performed by: SURGERY

## 2022-03-31 PROCEDURE — 76060000033 HC ANESTHESIA 1 TO 1.5 HR: Performed by: SURGERY

## 2022-03-31 PROCEDURE — C9113 INJ PANTOPRAZOLE SODIUM, VIA: HCPCS | Performed by: NURSE PRACTITIONER

## 2022-03-31 PROCEDURE — 2709999900 HC NON-CHARGEABLE SUPPLY: Performed by: SURGERY

## 2022-03-31 PROCEDURE — 74011000250 HC RX REV CODE- 250: Performed by: SURGERY

## 2022-03-31 PROCEDURE — 44180 LAP ENTEROLYSIS: CPT | Performed by: SURGERY

## 2022-03-31 PROCEDURE — 0DN84ZZ RELEASE SMALL INTESTINE, PERCUTANEOUS ENDOSCOPIC APPROACH: ICD-10-PCS | Performed by: SURGERY

## 2022-03-31 PROCEDURE — 77030040830 HC CATH URETH FOL MDII -A: Performed by: SURGERY

## 2022-03-31 RX ORDER — BUPIVACAINE HYDROCHLORIDE AND EPINEPHRINE 2.5; 5 MG/ML; UG/ML
INJECTION, SOLUTION EPIDURAL; INFILTRATION; INTRACAUDAL; PERINEURAL AS NEEDED
Status: DISCONTINUED | OUTPATIENT
Start: 2022-03-31 | End: 2022-03-31 | Stop reason: HOSPADM

## 2022-03-31 RX ORDER — MORPHINE SULFATE 2 MG/ML
2 INJECTION, SOLUTION INTRAMUSCULAR; INTRAVENOUS
Status: DISCONTINUED | OUTPATIENT
Start: 2022-03-31 | End: 2022-03-31 | Stop reason: HOSPADM

## 2022-03-31 RX ORDER — OXYCODONE AND ACETAMINOPHEN 5; 325 MG/1; MG/1
1 TABLET ORAL AS NEEDED
Status: DISCONTINUED | OUTPATIENT
Start: 2022-03-31 | End: 2022-03-31 | Stop reason: HOSPADM

## 2022-03-31 RX ORDER — KETAMINE HYDROCHLORIDE 10 MG/ML
INJECTION, SOLUTION INTRAMUSCULAR; INTRAVENOUS AS NEEDED
Status: DISCONTINUED | OUTPATIENT
Start: 2022-03-31 | End: 2022-03-31 | Stop reason: HOSPADM

## 2022-03-31 RX ORDER — SUCCINYLCHOLINE CHLORIDE 20 MG/ML
INJECTION INTRAMUSCULAR; INTRAVENOUS AS NEEDED
Status: DISCONTINUED | OUTPATIENT
Start: 2022-03-31 | End: 2022-03-31 | Stop reason: HOSPADM

## 2022-03-31 RX ORDER — METRONIDAZOLE 500 MG/100ML
500 INJECTION, SOLUTION INTRAVENOUS
Status: COMPLETED | OUTPATIENT
Start: 2022-03-31 | End: 2022-03-31

## 2022-03-31 RX ORDER — ONDANSETRON 2 MG/ML
INJECTION INTRAMUSCULAR; INTRAVENOUS AS NEEDED
Status: DISCONTINUED | OUTPATIENT
Start: 2022-03-31 | End: 2022-03-31 | Stop reason: HOSPADM

## 2022-03-31 RX ORDER — LIDOCAINE HYDROCHLORIDE 10 MG/ML
0.1 INJECTION, SOLUTION EPIDURAL; INFILTRATION; INTRACAUDAL; PERINEURAL AS NEEDED
Status: DISCONTINUED | OUTPATIENT
Start: 2022-03-31 | End: 2022-03-31 | Stop reason: HOSPADM

## 2022-03-31 RX ORDER — SODIUM CHLORIDE, SODIUM LACTATE, POTASSIUM CHLORIDE, CALCIUM CHLORIDE 600; 310; 30; 20 MG/100ML; MG/100ML; MG/100ML; MG/100ML
INJECTION, SOLUTION INTRAVENOUS
Status: DISCONTINUED | OUTPATIENT
Start: 2022-03-31 | End: 2022-03-31 | Stop reason: HOSPADM

## 2022-03-31 RX ORDER — SODIUM CHLORIDE, SODIUM LACTATE, POTASSIUM CHLORIDE, CALCIUM CHLORIDE 600; 310; 30; 20 MG/100ML; MG/100ML; MG/100ML; MG/100ML
75 INJECTION, SOLUTION INTRAVENOUS CONTINUOUS
Status: DISCONTINUED | OUTPATIENT
Start: 2022-03-31 | End: 2022-03-31 | Stop reason: HOSPADM

## 2022-03-31 RX ORDER — ROCURONIUM BROMIDE 10 MG/ML
INJECTION, SOLUTION INTRAVENOUS AS NEEDED
Status: DISCONTINUED | OUTPATIENT
Start: 2022-03-31 | End: 2022-03-31 | Stop reason: HOSPADM

## 2022-03-31 RX ORDER — SODIUM CHLORIDE 0.9 % (FLUSH) 0.9 %
5-40 SYRINGE (ML) INJECTION EVERY 8 HOURS
Status: DISCONTINUED | OUTPATIENT
Start: 2022-03-31 | End: 2022-03-31 | Stop reason: HOSPADM

## 2022-03-31 RX ORDER — OXYCODONE AND ACETAMINOPHEN 5; 325 MG/1; MG/1
1-2 TABLET ORAL
Status: DISCONTINUED | OUTPATIENT
Start: 2022-03-31 | End: 2022-04-02 | Stop reason: HOSPADM

## 2022-03-31 RX ORDER — DEXAMETHASONE SODIUM PHOSPHATE 4 MG/ML
INJECTION, SOLUTION INTRA-ARTICULAR; INTRALESIONAL; INTRAMUSCULAR; INTRAVENOUS; SOFT TISSUE AS NEEDED
Status: DISCONTINUED | OUTPATIENT
Start: 2022-03-31 | End: 2022-03-31 | Stop reason: HOSPADM

## 2022-03-31 RX ORDER — LIDOCAINE HYDROCHLORIDE 20 MG/ML
INJECTION, SOLUTION EPIDURAL; INFILTRATION; INTRACAUDAL; PERINEURAL AS NEEDED
Status: DISCONTINUED | OUTPATIENT
Start: 2022-03-31 | End: 2022-03-31 | Stop reason: HOSPADM

## 2022-03-31 RX ORDER — MIDAZOLAM HYDROCHLORIDE 1 MG/ML
1 INJECTION, SOLUTION INTRAMUSCULAR; INTRAVENOUS AS NEEDED
Status: DISCONTINUED | OUTPATIENT
Start: 2022-03-31 | End: 2022-03-31 | Stop reason: HOSPADM

## 2022-03-31 RX ORDER — DIPHENHYDRAMINE HYDROCHLORIDE 50 MG/ML
12.5 INJECTION, SOLUTION INTRAMUSCULAR; INTRAVENOUS AS NEEDED
Status: DISCONTINUED | OUTPATIENT
Start: 2022-03-31 | End: 2022-03-31 | Stop reason: HOSPADM

## 2022-03-31 RX ORDER — HYDROMORPHONE HYDROCHLORIDE 1 MG/ML
.5-1 INJECTION, SOLUTION INTRAMUSCULAR; INTRAVENOUS; SUBCUTANEOUS
Status: DISCONTINUED | OUTPATIENT
Start: 2022-03-31 | End: 2022-04-02 | Stop reason: HOSPADM

## 2022-03-31 RX ORDER — HYDROMORPHONE HYDROCHLORIDE 1 MG/ML
1 INJECTION, SOLUTION INTRAMUSCULAR; INTRAVENOUS; SUBCUTANEOUS
Status: DISCONTINUED | OUTPATIENT
Start: 2022-03-31 | End: 2022-03-31

## 2022-03-31 RX ORDER — ONDANSETRON 2 MG/ML
4 INJECTION INTRAMUSCULAR; INTRAVENOUS AS NEEDED
Status: DISCONTINUED | OUTPATIENT
Start: 2022-03-31 | End: 2022-03-31 | Stop reason: HOSPADM

## 2022-03-31 RX ORDER — FENTANYL CITRATE 50 UG/ML
50 INJECTION, SOLUTION INTRAMUSCULAR; INTRAVENOUS AS NEEDED
Status: DISCONTINUED | OUTPATIENT
Start: 2022-03-31 | End: 2022-03-31 | Stop reason: HOSPADM

## 2022-03-31 RX ORDER — PROPOFOL 10 MG/ML
INJECTION, EMULSION INTRAVENOUS AS NEEDED
Status: DISCONTINUED | OUTPATIENT
Start: 2022-03-31 | End: 2022-03-31 | Stop reason: HOSPADM

## 2022-03-31 RX ORDER — DIPHENHYDRAMINE HYDROCHLORIDE 50 MG/ML
12.5 INJECTION, SOLUTION INTRAMUSCULAR; INTRAVENOUS
Status: DISCONTINUED | OUTPATIENT
Start: 2022-03-31 | End: 2022-04-02 | Stop reason: HOSPADM

## 2022-03-31 RX ORDER — SODIUM CHLORIDE 0.9 % (FLUSH) 0.9 %
5-40 SYRINGE (ML) INJECTION AS NEEDED
Status: DISCONTINUED | OUTPATIENT
Start: 2022-03-31 | End: 2022-03-31 | Stop reason: HOSPADM

## 2022-03-31 RX ORDER — FENTANYL CITRATE 50 UG/ML
25 INJECTION, SOLUTION INTRAMUSCULAR; INTRAVENOUS
Status: DISCONTINUED | OUTPATIENT
Start: 2022-03-31 | End: 2022-03-31 | Stop reason: HOSPADM

## 2022-03-31 RX ORDER — SODIUM CHLORIDE 9 MG/ML
50 INJECTION, SOLUTION INTRAVENOUS CONTINUOUS
Status: DISCONTINUED | OUTPATIENT
Start: 2022-03-31 | End: 2022-03-31 | Stop reason: HOSPADM

## 2022-03-31 RX ORDER — ENOXAPARIN SODIUM 100 MG/ML
40 INJECTION SUBCUTANEOUS EVERY 24 HOURS
Status: DISCONTINUED | OUTPATIENT
Start: 2022-03-31 | End: 2022-03-31

## 2022-03-31 RX ORDER — FENTANYL CITRATE 50 UG/ML
INJECTION, SOLUTION INTRAMUSCULAR; INTRAVENOUS AS NEEDED
Status: DISCONTINUED | OUTPATIENT
Start: 2022-03-31 | End: 2022-03-31 | Stop reason: HOSPADM

## 2022-03-31 RX ORDER — OXYCODONE AND ACETAMINOPHEN 5; 325 MG/1; MG/1
1-2 TABLET ORAL
Qty: 20 TABLET | Refills: 0 | Status: SHIPPED | OUTPATIENT
Start: 2022-03-31 | End: 2022-04-04

## 2022-03-31 RX ORDER — ESMOLOL HYDROCHLORIDE 10 MG/ML
INJECTION INTRAVENOUS AS NEEDED
Status: DISCONTINUED | OUTPATIENT
Start: 2022-03-31 | End: 2022-03-31 | Stop reason: HOSPADM

## 2022-03-31 RX ORDER — LEVOFLOXACIN 5 MG/ML
500 INJECTION, SOLUTION INTRAVENOUS
Status: COMPLETED | OUTPATIENT
Start: 2022-03-31 | End: 2022-03-31

## 2022-03-31 RX ORDER — MIDAZOLAM HYDROCHLORIDE 1 MG/ML
0.5 INJECTION, SOLUTION INTRAMUSCULAR; INTRAVENOUS
Status: DISCONTINUED | OUTPATIENT
Start: 2022-03-31 | End: 2022-03-31 | Stop reason: HOSPADM

## 2022-03-31 RX ORDER — HYDROMORPHONE HYDROCHLORIDE 1 MG/ML
0.2 INJECTION, SOLUTION INTRAMUSCULAR; INTRAVENOUS; SUBCUTANEOUS
Status: DISCONTINUED | OUTPATIENT
Start: 2022-03-31 | End: 2022-03-31 | Stop reason: HOSPADM

## 2022-03-31 RX ORDER — ENOXAPARIN SODIUM 100 MG/ML
40 INJECTION SUBCUTANEOUS EVERY 24 HOURS
Status: DISCONTINUED | OUTPATIENT
Start: 2022-04-01 | End: 2022-04-02 | Stop reason: HOSPADM

## 2022-03-31 RX ORDER — ACETAMINOPHEN 325 MG/1
650 TABLET ORAL
Status: DISCONTINUED | OUTPATIENT
Start: 2022-03-31 | End: 2022-04-02 | Stop reason: HOSPADM

## 2022-03-31 RX ADMIN — HYDROMORPHONE HYDROCHLORIDE 0.5 MG: 1 INJECTION, SOLUTION INTRAMUSCULAR; INTRAVENOUS; SUBCUTANEOUS at 12:40

## 2022-03-31 RX ADMIN — DEXAMETHASONE SODIUM PHOSPHATE 4 MG: 4 INJECTION, SOLUTION INTRAMUSCULAR; INTRAVENOUS at 16:24

## 2022-03-31 RX ADMIN — ONDANSETRON 4 MG: 2 INJECTION INTRAMUSCULAR; INTRAVENOUS at 08:59

## 2022-03-31 RX ADMIN — FENTANYL CITRATE 50 MCG: 50 INJECTION, SOLUTION INTRAMUSCULAR; INTRAVENOUS at 16:17

## 2022-03-31 RX ADMIN — ROCURONIUM BROMIDE 5 MG: 10 SOLUTION INTRAVENOUS at 15:51

## 2022-03-31 RX ADMIN — HYDROMORPHONE HYDROCHLORIDE 0.5 MG: 1 INJECTION, SOLUTION INTRAMUSCULAR; INTRAVENOUS; SUBCUTANEOUS at 04:14

## 2022-03-31 RX ADMIN — Medication 20 MG: at 16:17

## 2022-03-31 RX ADMIN — ONDANSETRON HYDROCHLORIDE 4 MG: 2 INJECTION, SOLUTION INTRAMUSCULAR; INTRAVENOUS at 16:24

## 2022-03-31 RX ADMIN — SUGAMMADEX 200 MG: 100 INJECTION, SOLUTION INTRAVENOUS at 16:47

## 2022-03-31 RX ADMIN — HYDROMORPHONE HYDROCHLORIDE 0.5 MG: 1 INJECTION, SOLUTION INTRAMUSCULAR; INTRAVENOUS; SUBCUTANEOUS at 08:59

## 2022-03-31 RX ADMIN — SODIUM CHLORIDE 125 ML/HR: 9 INJECTION, SOLUTION INTRAVENOUS at 03:02

## 2022-03-31 RX ADMIN — SODIUM CHLORIDE, PRESERVATIVE FREE 10 ML: 5 INJECTION INTRAVENOUS at 08:59

## 2022-03-31 RX ADMIN — ROCURONIUM BROMIDE 30 MG: 10 SOLUTION INTRAVENOUS at 16:09

## 2022-03-31 RX ADMIN — PANTOPRAZOLE SODIUM 40 MG: 40 INJECTION, POWDER, FOR SOLUTION INTRAVENOUS at 08:59

## 2022-03-31 RX ADMIN — LEVOFLOXACIN 500 MG: 5 INJECTION, SOLUTION INTRAVENOUS at 16:00

## 2022-03-31 RX ADMIN — PROPOFOL 100 MG: 10 INJECTION, EMULSION INTRAVENOUS at 15:51

## 2022-03-31 RX ADMIN — ONDANSETRON 4 MG: 2 INJECTION INTRAMUSCULAR; INTRAVENOUS at 04:14

## 2022-03-31 RX ADMIN — SUCCINYLCHOLINE CHLORIDE 120 MG: 20 INJECTION, SOLUTION INTRAMUSCULAR; INTRAVENOUS at 15:51

## 2022-03-31 RX ADMIN — Medication 10 MG: at 16:30

## 2022-03-31 RX ADMIN — SODIUM CHLORIDE, POTASSIUM CHLORIDE, SODIUM LACTATE AND CALCIUM CHLORIDE: 600; 310; 30; 20 INJECTION, SOLUTION INTRAVENOUS at 15:30

## 2022-03-31 RX ADMIN — FENTANYL CITRATE 50 MCG: 50 INJECTION, SOLUTION INTRAMUSCULAR; INTRAVENOUS at 15:49

## 2022-03-31 RX ADMIN — ESMOLOL HYDROCHLORIDE 30 MG: 10 INJECTION, SOLUTION INTRAVENOUS at 16:29

## 2022-03-31 RX ADMIN — METRONIDAZOLE 500 MG: 500 SOLUTION INTRAVENOUS at 16:05

## 2022-03-31 RX ADMIN — LIDOCAINE HYDROCHLORIDE 60 MG: 20 INJECTION, SOLUTION EPIDURAL; INFILTRATION; INTRACAUDAL; PERINEURAL at 15:51

## 2022-03-31 NOTE — PERIOP NOTES
TRANSFER - OUT REPORT:    Verbal report given to Nikki(name) on [address]  being transferred to [phone] (unit) for routine post - op       Report consisted of patients Situation, Background, Assessment and   Recommendations(SBAR). Time Pre op antibiotic given:1600  Anesthesia Stop time: 7728  Milligan Present on Transfer to floor:n  Order for Milligan on Chart:n  Discharge Prescriptions with Chart:n    Information from the following report(s) SBAR, OR Summary, Intake/Output, MAR and Accordion was reviewed with the receiving nurse. Opportunity for questions and clarification was provided. Is the patient on 02? YES       L/Min 2       Other     Is the patient on a monitor? NO    Is the nurse transporting with the patient? NO    Surgical Waiting Area notified of patient's transfer from PACU? YES      The following personal items collected during your admission accompanied patient upon transfer:   Dental Appliance: Dental Appliances: Other (comment) (2 Porcelin Veneirs, [place])  Vision: Visual Aid: At bedside,Glasses  Hearing Aid:    Jewelry: Jewelry: None  Clothing: Clothing: At bedside  Other Valuables: Other Valuables: Wallet  Valuables sent to safe:         All belongings in pt's room

## 2022-03-31 NOTE — ANESTHESIA POSTPROCEDURE EVALUATION
Procedure(s):  DIAGNOSTIC LAPAROSCOPY, LYSIS OF ADHESION. general    Anesthesia Post Evaluation      Multimodal analgesia: multimodal analgesia used between 6 hours prior to anesthesia start to PACU discharge  Patient location during evaluation: PACU  Patient participation: complete - patient participated  Level of consciousness: awake and alert  Pain management: adequate  Airway patency: patent  Anesthetic complications: no  Cardiovascular status: acceptable  Respiratory status: acceptable  Hydration status: acceptable  Comments: Seen, no complaints   Post anesthesia nausea and vomiting:  none  Final Post Anesthesia Temperature Assessment:  Normothermia (36.0-37.5 degrees C)      INITIAL Post-op Vital signs:   Vitals Value Taken Time   /56 03/31/22 1720   Temp 36.8 °C (98.3 °F) 03/31/22 1702   Pulse 69 03/31/22 1723   Resp 13 03/31/22 1723   SpO2 94 % 03/31/22 1723   Vitals shown include unvalidated device data.

## 2022-03-31 NOTE — OP NOTES
Operative Note    Patient: Danuta Oswald MRN: [id number]  SSN: [id number]    YOB: 1945  Age: 78 y.o. Sex: female      Date of Surgery: 3/31/2022     Preoperative Diagnosis: SMALL BOWELOBSTRUCTION      Postoperative Diagnosis: SMALL BOWEL OBSTRUCTION     Surgeon(s) and Role:     Boone Oh MD - Primary    Surgical Assistant: GISSELLE   Anesthesia: General     Procedure: Procedure(s):  DIAGNOSTIC LAPAROSCOPY, LYSIS OF ADHESION    Indications: The patient was admitted to the hospital with small bowel obstruction. Over the course of 24 hours she seemed to be getting much worse. The patient now presents for laparoscopy after discussing therapeutic alternatives. Discussed the risk of surgery including infection, hematoma, bleeding, bile duct or bowel injury, recurrence or persistence of symptoms or  The patient understands the risk that the procedure could be an open procedure. The patient understands the risks, any and all questions were answered to the patient's satisfaction, and they freely signed the consent for operation. Procedure in Detail: Patient was jasper brought to the operating placed the operative in supine vision. General endotracheal anesthesia was then established. She was then prepped draped in usual sterile fashion 5 mm left subcostal incision was then made and a 5 mm Optiview trocar was then placed. Attention to additional 5 mm trochars were then placed. The concern was in the left lower quadrant. Immediately upon entering we noted dusky appearing inflamed bowel down in the left lower quadrant we were able to identify exactly 1 adhesion did seem to be tethering the bowel. This was lysed. No further adhesions were identified. We ran the bowel and found no other signs of obstruction. After the lysis of the adhesion the bowel seemed to pink up, though while she had to be viable.   The abdomen was irrigated there was a small amount of bleeding that had stopped   And no further bleeding was seen. The abdomen was then desufflated trochars were removed local anesthetic was injected. The skin was then closed using a 4-0 Monocryl subcuticular fashion. Mastisol Steri-Strips (and Tegaderms were applied. Patient was woken they were taken back in stable condition    Estimated Blood Loss:  10  Findings  1 adhesion causing obstruction    Implants: * No implants in log *            Specimens: * No specimens in log *        Drains: None                Complications: None    Counts: Sponge and needle counts were correct times two.

## 2022-03-31 NOTE — DISCHARGE INSTRUCTIONS
Patient Education      No heavy lifting more than 10-15 lbs  May remove outer dressings in 3 days  Leave the steri-strips in place  May shower in 2 days  No tub baths or swimming. May walk and climb stairs. Bowel Blockage (Intestinal Obstruction): Care Instructions  Your Care Instructions  A bowel blockage, also called an intestinal obstruction, can prevent gas, fluids, or solids from moving through the intestines normally. It can cause constipation and, rarely, diarrhea. You may have pain, nausea, vomiting, and cramping. Most of the time, complete blockages require a stay in the hospital and possibly surgery. But if your bowel is only partly blocked, your doctor may tell you to wait until it clears on its own and you are able to pass gas and stool. If so, there are things you can do at home to help make you feel better. If you have had surgery for a bowel blockage, there are things you can do at home to make sure you heal well. You can also make some changes to keep your bowel from becoming blocked again. Follow-up care is a key part of your treatment and safety. Be sure to make and go to all appointments, and call your doctor if you are having problems. It's also a good idea to know your test results and keep a list of the medicines you take. How can you care for yourself at home? If your doctor has told you to wait at home for a blockage to clear on its own:  · Follow your doctor's instructions. These may include eating a liquid diet to avoid complete blockage. · Be safe with medicines. Take your medicines exactly as prescribed. Call your doctor if you think you are having a problem with your medicine. · Put a heating pad set on low on your belly to relieve mild cramps and pain. To prevent another blockage  · Try to eat smaller amounts of food more often. For example, have 5 or 6 small meals throughout the day instead of 2 or 3 large meals. · Chew your food very well.  Try to chew each bite about 20 times or until it is liquid. · Avoid high-fiber foods and raw fruits and vegetables with skins, husks, strings, or seeds. These can form a ball of undigested material that can cause a blockage if a part of your bowel is scarred or narrowed. · Check with your doctor before you eat whole-grain products or use a fiber supplement such as Citrucel or Metamucil. · To help you have regular bowel movements, eat at regular times, do not strain during a bowel movement, and drink plenty of water. If you have kidney, heart, or liver disease and have to limit fluids, talk with your doctor before you increase the amount of fluids you drink. · Drink high-calorie liquid formulas if your doctor says to. Severe symptoms may make it hard for your body to take in vitamins and minerals. · Get regular exercise. It helps you digest your food better. Get at least 30 minutes of physical activity on most days of the week. Walking is a good choice. When should you call for help? Call your doctor now or seek immediate medical care if:    · You have a fever.     · You are vomiting.     · You have new or worse belly pain.     · You cannot pass stools or gas. Watch closely for changes in your health, and be sure to contact your doctor if you have any problems. Where can you learn more? Go to http://www.gray.com/  Enter B082 in the search box to learn more about \"Bowel Blockage (Intestinal Obstruction): Care Instructions. \"  Current as of: September 8, 2021               Content Version: 13.2  © 2006-2022 [employer]. Care instructions adapted under license by [employer] (which disclaims liability or warranty for this information). If you have questions about a medical condition or this instruction, always ask your healthcare professional. [place] [address] any warranty or liability for your use of this information.        Patient Education        Laparoscopy: What to Expect at [address]  After laparoscopic surgery, you are likely to have pain for the next several days. You may have a low fever and feel tired and sick to your stomach. This is common. You should feel better after 1 to 2 weeks. This care sheet gives you a general idea about how long it will take for you to recover. But each person recovers at a different pace. Follow the steps below to get better as quickly as possible. How can you care for yourself at home? Activity    · Rest when you feel tired. Getting enough sleep will help you recover.     · Try to walk each day. Start by walking a little more than you did the day before. Bit by bit, increase the amount you walk. Walking boosts blood flow and helps prevent pneumonia and constipation.     · Avoid strenuous activities, such as bicycle riding, jogging, weight lifting, or aerobic exercise, until your doctor says it is okay.     · Avoid lifting anything that would make you strain. This may include a child, heavy grocery bags and milk containers, a heavy briefcase or backpack, cat litter or dog food bags, or a vacuum .     · You may also have some shoulder or back pain. This pain is caused by the gas your doctor used to inflate your belly to help see your organs better. The pain usually lasts about 1 or 2 days.     · You may drive when you are no longer taking pain medicine and can quickly move your foot from the gas pedal to the brake. You must also be able to sit comfortably for a long period of time, even if you do not plan to go far. You might get caught in traffic.     · You may need to take a few days to a few weeks off work. It depends on the type of work you do and how you feel.     · You may shower 24 to 48 hours after surgery, if your doctor okays it. Pat the cut (incision) dry. Do not take a bath for the first 2 weeks, or until your doctor tells you it is okay.    Diet    · If your stomach is upset, try bland, low-fat foods such as plain rice, broiled chicken, toast, and yogurt.     · Drink plenty of fluids to prevent dehydration. Choose water and other clear liquids. If you have kidney, heart, or liver disease and have to limit fluids, talk with your doctor before you increase the amount of fluids you drink.     · You may notice that your bowel movements are not regular right after your surgery. This is common. Avoid constipation and straining with bowel movements. You may want to take a fiber supplement every day. If you have not had a bowel movement after a couple of days, ask your doctor about taking a mild laxative. Medicines    · Your doctor will tell you if and when you can restart your medicines. You will also get instructions about taking any new medicines.     · If you take aspirin or some other blood thinner, ask your doctor if and when to start taking it again. Make sure that you understand exactly what your doctor wants you to do.     · Take pain medicines exactly as directed. ? If the doctor gave you a prescription medicine for pain, take it as prescribed. ? If you are not taking a prescription pain medicine, ask your doctor if you can take an over-the-counter medicine.     · If your doctor prescribed antibiotics, take them as directed. Do not stop taking them just because you feel better. You need to take the full course of antibiotics.     · If you think your pain medicine is making you sick to your stomach:  ? Take your medicine after meals (unless your doctor has told you not to). ? Ask your doctor for a different pain medicine. Incision care    · If you have strips of tape on the incision, leave the tape on for a week or until it falls off.     · Wash the area daily with warm, soapy water and pat it dry. Don't use hydrogen peroxide or alcohol, which can slow healing. You may cover the area with a gauze bandage if it weeps or rubs against clothing. Change the bandage every day.    Follow-up care is a key part of your treatment and safety. Be sure to make and go to all appointments, and call your doctor if you are having problems. It's also a good idea to know your test results and keep a list of the medicines you take. When should you call for help? Call 911 anytime you think you may need emergency care. For example, call if:    · You passed out (lost consciousness).     · You are short of breath. Call your doctor now or seek immediate medical care if:    · You have pain that does not get better after you take pain medicine.     · You have loose stitches, or your incision comes open.     · Bright red blood has soaked through your bandage.     · You have signs of infection, such as:  ? Increased pain, swelling, warmth, or redness. ? Red streaks leading from the incision. ? Pus draining from the incision. ? A fever.     · You are sick to your stomach or cannot keep fluids down.     · You have signs of a blood clot in your leg (called a deep vein thrombosis), such as:  ? Pain in your calf, back of the knee, thigh, or groin. ? Redness and swelling in your leg or groin.     · You cannot pass stools or gas. Watch closely for any changes in your health, and be sure to contact your doctor if you have any problems. Where can you learn more? Go to http://www.gray.com/  Enter G657 in the search box to learn more about \"Laparoscopy: What to Expect at Home. \"  Current as of: September 8, 2021               Content Version: 13.2  © 2006-2022 [employer]. Care instructions adapted under license by [employer] (which disclaims liability or warranty for this information). If you have questions about a medical condition or this instruction, always ask your healthcare professional. [place] [address] any warranty or liability for your use of this information.

## 2022-03-31 NOTE — PROGRESS NOTES
CITLALY:  1. RUR-5%  2. Return home with spouse when medically stable. 3. General surgery following. Care Management Interventions  PCP Verified by CM: Yes  Palliative Care Criteria Met (RRAT>21 & CHF Dx)?: No  Mode of Transport at Discharge: Other (see comment) (her )  Álvaro Signup: Yes  Discharge Durable Medical Equipment: No  Health Maintenance Reviewed: Yes  Physical Therapy Consult: No  Occupational Therapy Consult: No  Speech Therapy Consult: No  Support Systems: Spouse/Significant Other  Confirm Follow Up Transport: Self  The Patient and/or Patient Representative was Provided with a Choice of Provider and Agrees with the Discharge Plan?: Yes   Resource Information Provided?: No  Discharge Location  Patient Expects to be Discharged to[de-identified] Home with family assistance    Reason for Admission:  SBO                     RUR Score:      5%               Plan for utilizing home health:      No indication at this time. PCP: First and Last name:  Dereck Mills DO     [facility]   Are you a current patient: Yes/No: yes   Approximate date of last visit:  Within the last 6 months. Can you participate in a virtual visit with your PCP: unknown                    Current Advanced Directive/Advance Care Plan: Full Code      Healthcare Decision Maker:   Click here to complete [address] including selection of the Healthcare Decision Maker Relationship (ie \"Primary\")                             Transition of Care Plan:                      CM met with patient at bedside to explain role and offer support to her. She lives with her  in [facility], but they reside on the first level. The patient is independent with ADLs and IADLs prior to admission, no DME use. She prefers to use [facility] [place] [facility] [employer], or [place]. She plans to discharge home when medically stable, and does not have any concerns at this time.     Medicare pt has received, reviewed, and signed [facility] letter informing them of their right to appeal the discharge. Signed copy has been placed on pt bedside chart.     Marquis Marks [facility]

## 2022-03-31 NOTE — ANESTHESIA PREPROCEDURE EVALUATION
Relevant Problems   No relevant active problems       Anesthetic History   No history of anesthetic complications            Review of Systems / Medical History  Patient summary reviewed, nursing notes reviewed and pertinent labs reviewed    Pulmonary  Within defined limits                 Neuro/Psych   Within defined limits           Cardiovascular                  Exercise tolerance: >4 METS     GI/Hepatic/Renal               Comments: SBO (small bowel obstruction) (HCC) Endo/Other      Hypothyroidism       Other Findings   Comments: SBO (small bowel obstruction) (HCC)           Physical Exam    Airway  Mallampati: IV  TM Distance: 4 - 6 cm  Neck ROM: normal range of motion   Mouth opening: Normal     Cardiovascular  Regular rate and rhythm,  S1 and S2 normal,  no murmur, click, rub, or gallop  Rhythm: regular  Rate: normal         Dental      Comments: Veneers    Pulmonary  Breath sounds clear to auscultation               Abdominal  GI exam deferred       Other Findings            Anesthetic Plan    ASA: 2  Anesthesia type: general          Induction: Intravenous and RSI  Anesthetic plan and risks discussed with: Patient

## 2022-03-31 NOTE — PROGRESS NOTES
Progress Note    Patient: Sharmin Hernandes MRN: [id number]  SSN: [id number]    YOB: 1945  Age: 78 y.o. Sex: female      Admit Date: 3/30/2022    * No surgery found *    Procedure:      Subjective:     Patient states that she is feeling significantly worse overnight. She did vomit. She is not passing gas. Objective:     Visit Vitals  BP (!) 156/78 (BP 1 Location: Left arm, BP Patient Position: At rest)   Pulse 65   Temp 97.5 °F (36.4 °C)   Resp 20   SpO2 95%       Temp (24hrs), Av.6 °F (36.4 °C), Min:97.1 °F (36.2 °C), Max:98.5 °F (36.9 °C)      Physical Exam:    General alert in no acute distress  Lungs clear bilaterally  Heart regular rate and rhythm  Abdomen soft tender in the left lower quadrant mildly distended    Data Review: images and reports reviewed  AXR- IMPRESSION  No significant change in localized small bowel dilation left lower  quadrant again concerning for small bowel obstruction. Lab Review: All lab results for the last 24 hours reviewed. Assessment:     Hospital Problems  Never Reviewed          Codes Class Noted POA    [facility] ICD-10-CM: R04.662  ICD-9-CM: 560.9  3/30/2022 Unknown              Plan/Recommendations/Medical Decision Making:   Small bowel obstruction  Clinically she seems to be getting worse overnight. Her abdominal x-ray is unchanged. Have recommended to her that we proceed with diagnostic laparoscopy with lysis of adhesions. Have given her other options such as NG tube though I think that this will be unsuccessful. Have discussed with her that this may require an open operation or bowel resection  Am awaiting her decision. ADDENDUM  Patient is amenable to surgery  Risks and benefits have been discussed and she agrees to proceed.

## 2022-04-01 LAB
ANION GAP SERPL CALC-SCNC: 3 MMOL/L (ref 5–15)
BASOPHILS # BLD: 0 K/UL (ref 0–0.1)
BASOPHILS NFR BLD: 0 % (ref 0–1)
BUN SERPL-MCNC: 10 MG/DL (ref 6–20)
BUN/CREAT SERPL: 18 (ref 12–20)
CALCIUM SERPL-MCNC: 8.3 MG/DL (ref 8.5–10.1)
CHLORIDE SERPL-SCNC: 106 MMOL/L (ref 97–108)
CO2 SERPL-SCNC: 25 MMOL/L (ref 21–32)
CREAT SERPL-MCNC: 0.56 MG/DL (ref 0.55–1.02)
DIFFERENTIAL METHOD BLD: ABNORMAL
EOSINOPHIL # BLD: 0 K/UL (ref 0–0.4)
EOSINOPHIL NFR BLD: 0 % (ref 0–7)
ERYTHROCYTE [DISTWIDTH] IN BLOOD BY AUTOMATED COUNT: 12.9 % (ref 11.5–14.5)
GLUCOSE SERPL-MCNC: 118 MG/DL (ref 65–100)
HCT VFR BLD AUTO: 34.7 % (ref 35–47)
HGB BLD-MCNC: 11.4 G/DL (ref 11.5–16)
IMM GRANULOCYTES # BLD AUTO: 0 K/UL (ref 0–0.04)
IMM GRANULOCYTES NFR BLD AUTO: 0 % (ref 0–0.5)
LYMPHOCYTES # BLD: 0.4 K/UL (ref 0.8–3.5)
LYMPHOCYTES NFR BLD: 6 % (ref 12–49)
MAGNESIUM SERPL-MCNC: 2 MG/DL (ref 1.6–2.4)
MCH RBC QN AUTO: 31.3 PG (ref 26–34)
MCHC RBC AUTO-ENTMCNC: 32.9 G/DL (ref 30–36.5)
MCV RBC AUTO: 95.3 FL (ref 80–99)
MONOCYTES # BLD: 0.1 K/UL (ref 0–1)
MONOCYTES NFR BLD: 2 % (ref 5–13)
NEUTS SEG # BLD: 5.7 K/UL (ref 1.8–8)
NEUTS SEG NFR BLD: 92 % (ref 32–75)
NRBC # BLD: 0 K/UL (ref 0–0.01)
NRBC BLD-RTO: 0 PER 100 WBC
PHOSPHATE SERPL-MCNC: 2.5 MG/DL (ref 2.6–4.7)
PLATELET # BLD AUTO: 260 K/UL (ref 150–400)
PMV BLD AUTO: 9.8 FL (ref 8.9–12.9)
POTASSIUM SERPL-SCNC: 4.2 MMOL/L (ref 3.5–5.1)
RBC # BLD AUTO: 3.64 M/UL (ref 3.8–5.2)
RBC MORPH BLD: ABNORMAL
SODIUM SERPL-SCNC: 134 MMOL/L (ref 136–145)
WBC # BLD AUTO: 6.2 K/UL (ref 3.6–11)

## 2022-04-01 PROCEDURE — 85025 COMPLETE CBC W/AUTO DIFF WBC: CPT

## 2022-04-01 PROCEDURE — 65270000029 HC RM PRIVATE

## 2022-04-01 PROCEDURE — 74011000250 HC RX REV CODE- 250: Performed by: NURSE PRACTITIONER

## 2022-04-01 PROCEDURE — 83735 ASSAY OF MAGNESIUM: CPT

## 2022-04-01 PROCEDURE — C9113 INJ PANTOPRAZOLE SODIUM, VIA: HCPCS | Performed by: NURSE PRACTITIONER

## 2022-04-01 PROCEDURE — 84100 ASSAY OF PHOSPHORUS: CPT

## 2022-04-01 PROCEDURE — 74011250636 HC RX REV CODE- 250/636: Performed by: NURSE PRACTITIONER

## 2022-04-01 PROCEDURE — 80048 BASIC METABOLIC PNL TOTAL CA: CPT

## 2022-04-01 PROCEDURE — 36415 COLL VENOUS BLD VENIPUNCTURE: CPT

## 2022-04-01 RX ADMIN — PANTOPRAZOLE SODIUM 40 MG: 40 INJECTION, POWDER, FOR SOLUTION INTRAVENOUS at 08:27

## 2022-04-01 RX ADMIN — SODIUM CHLORIDE 125 ML/HR: 9 INJECTION, SOLUTION INTRAVENOUS at 00:46

## 2022-04-01 RX ADMIN — SODIUM CHLORIDE, PRESERVATIVE FREE 10 ML: 5 INJECTION INTRAVENOUS at 08:27

## 2022-04-01 NOTE — PROGRESS NOTES
Pt is laying in bed with even and unlabored respirations on room air. No complaints of pain at this time. No distress noted. Pt wound dressing is clean, dry, and intact. Pt was able to tolerate regular diet with no pain or nausea. Plan of care is to discharge the pt tomorrow. All safety precautions are in place. Bed in low position and locked. Call bell within reach. Problem: General Medical Care Plan  Goal: *Vital signs within specified parameters  Outcome: Progressing Towards Goal  Goal: *Labs within defined limits  Outcome: Progressing Towards Goal  Goal: *Absence of infection signs and symptoms  Outcome: Progressing Towards Goal  Goal: *Optimal pain control at patient's stated goal  Outcome: Progressing Towards Goal  Goal: *Skin integrity maintained  Outcome: Progressing Towards Goal  Goal: *Fluid volume balance  Outcome: Progressing Towards Goal  Goal: *Optimize nutritional status  Outcome: Progressing Towards Goal  Goal: *Anxiety reduced or absent  Outcome: Progressing Towards Goal  Goal: *Progressive mobility and function (eg: ADL's)  Outcome: Progressing Towards Goal     Problem: Patient Education: Go to Patient Education Activity  Goal: Patient/Family Education  Outcome: Progressing Towards Goal     Problem: Falls - Risk of  Goal: *Absence of Falls  Description: Document Chinyere Fall Risk and appropriate interventions in the flowsheet.   Outcome: Progressing Towards Goal  Note: Fall Risk Interventions:            Medication Interventions: Evaluate medications/consider consulting pharmacy                   Problem: Patient Education: Go to Patient Education Activity  Goal: Patient/Family Education  Outcome: Progressing Towards Goal

## 2022-04-01 NOTE — PROGRESS NOTES
[employer] [facility] at [facility]    POD #1    Subjective     She is feeling much better now.   She has no abdominal pain, no nausea vomiting, tolerating clears well passing gas    Objective     Patient Vitals for the past 24 hrs:   Temp Pulse Resp BP SpO2   04/01/22 0830 98.5 °F (36.9 °C) (!) 52 17 112/63 91 %   04/01/22 0131 97.8 °F (36.6 °C) (!) 50 17 (!) 111/53 95 %   03/31/22 2148 97.8 °F (36.6 °C) (!) 57 18 117/66 96 %   03/31/22 2038 98.4 °F (36.9 °C) (!) 56 17 118/68 96 %   03/31/22 1911 97.9 °F (36.6 °C) (!) 54 16 129/62 96 %   03/31/22 1827 98.2 °F (36.8 °C) (!) 58 16 111/69 95 %   03/31/22 1800 -- (!) 57 12 -- 96 %   03/31/22 1745 98.7 °F (37.1 °C) (!) 56 16 (!) 109/53 96 %   03/31/22 1730 -- 69 10 (!) 114/55 96 %   03/31/22 1720 -- 70 14 (!) 112/56 96 %   03/31/22 1715 -- 73 13 (!) 115/59 95 %   03/31/22 1710 -- 79 12 113/60 96 %   03/31/22 1705 -- 86 16 113/67 92 %   03/31/22 1702 98.3 °F (36.8 °C) 82 15 (!) 113/54 97 %   03/31/22 1700 98.3 °F (36.8 °C) 82 17 (!) 113/54 (!) 84 %   03/31/22 1505 98.9 °F (37.2 °C) 61 16 (!) 127/59 95 %   03/31/22 1452 97.9 °F (36.6 °C) 60 16 135/63 92 %       Date 03/31/22 0700 - 04/01/22 0659 04/01/22 0700 - 04/02/22 0659   Shift 5492-2511 6475-8505 24 Hour Total 1231-4622 3842-5976 24 Hour Total   INTAKE   I.V.(mL/kg/hr) 950(1.1)  950(0.5)        I.V. 100  100        Volume (lactated Ringers infusion) 650  650        Volume (levoFLOXacin (LEVAQUIN) 500 mg in D5W IVPB) 100  100        Volume (metroNIDAZOLE (FLAGYL) IVPB premix 500 mg) 100  100      Shift Total(mL/kg) 950(13.1)  950(13.1)      OUTPUT   Urine(mL/kg/hr) 75(0.1)  75(0)        Urine Output 75  75      Blood 0  0        Estimated Blood Loss 0  0      Shift Total(mL/kg) 75(1)  75(1)        875      Weight (kg) 72.6 72.6 72.6 72.6 72.6 72.6       PE  Pulm - CTAB  CV - RRR  Abd -soft, nondistended, bowel sounds present, nontender to palpation    Labs  Lab Results   Component Value Date/Time    Sodium 134 (L) 04/01/2022 12:52 AM    Potassium 4.2 04/01/2022 12:52 AM    Chloride 106 04/01/2022 12:52 AM    CO2 25 04/01/2022 12:52 AM    Anion gap 3 (L) 04/01/2022 12:52 AM    Glucose 118 (H) 04/01/2022 12:52 AM    BUN 10 04/01/2022 12:52 AM    Creatinine 0.56 04/01/2022 12:52 AM    BUN/Creatinine ratio 18 04/01/2022 12:52 AM    GFR est AA >60 04/01/2022 12:52 AM    GFR est non-AA >60 04/01/2022 12:52 AM    Calcium 8.3 (L) 04/01/2022 12:52 AM    Bilirubin, total 0.5 03/30/2022 10:23 AM    Alk. phosphatase 55 03/30/2022 10:23 AM    Protein, total 7.5 03/30/2022 10:23 AM    Albumin 3.8 03/30/2022 10:23 AM    Globulin 3.7 03/30/2022 10:23 AM    A-G Ratio 1.0 (L) 03/30/2022 10:23 AM    ALT (SGPT) 28 03/30/2022 10:23 AM    AST (SGOT) 34 03/30/2022 10:23 AM     Lab Results   Component Value Date/Time    WBC 6.2 04/01/2022 12:52 AM    HGB 11.4 (L) 04/01/2022 12:52 AM    HCT 34.7 (L) 04/01/2022 12:52 AM    PLATELET 657 50/84/3046 12:52 AM    MCV 95.3 04/01/2022 12:52 AM         Assessment     Laney Schwartz is a 78 y. o.yr old female status post laparoscopic lysis of adhesions for small bowel obstruction    Plan     She is doing very well and her small bowel obstruction appears to be resolved  Start her on a regular diet  DC IV fluids  Possible discharge home later today or tomorrow    Kwesi Mast MD

## 2022-04-01 NOTE — PROGRESS NOTES
Doing  Well this PM, tolerating diet, had a BM, no pain.   Plan for DC home tomorrow am    Veronica Espitia

## 2022-04-02 VITALS
RESPIRATION RATE: 16 BRPM | TEMPERATURE: 99.5 F | HEART RATE: 51 BPM | OXYGEN SATURATION: 97 % | HEIGHT: 63 IN | SYSTOLIC BLOOD PRESSURE: 130 MMHG | BODY MASS INDEX: 28.35 KG/M2 | DIASTOLIC BLOOD PRESSURE: 75 MMHG | WEIGHT: 160 LBS

## 2022-04-02 NOTE — DISCHARGE SUMMARY
Physician Discharge Summary     Patient ID:  Odell Harris  [id number]  89 y.o.  1945    Allergies: Keflex [cephalexin]    Admit Date: 3/30/2022    Discharge Date: 4/2/2022    * Admission Diagnoses: SBO (small bowel obstruction) ([facility] [Y16.867]    * Discharge Diagnoses:    Hospital Problems as of 4/2/2022 Date Reviewed: 3/31/2022          Codes Class Noted - Resolved POA    SBO (small bowel obstruction) ([facility] ICD-10-CM: J60.145  ICD-9-CM: 560.9  3/30/2022 - Present Unknown               Admission Condition: Poor    * Discharge Condition: fair    * Procedures: Procedure(s):  DIAGNOSTIC LAPAROSCOPY, LYSIS OF ADHESION    * Hospital Course:   Normal hospital course for this procedure. Consults: None    Significant Diagnostic Studies: none    * Disposition: Home    Discharge Medications:   Current Discharge Medication List      START taking these medications    Details   oxyCODONE-acetaminophen (PERCOCET) 5-325 mg per tablet Take 1-2 Tablets by mouth every six (6) hours as needed for Pain for up to 4 days. Max Daily Amount: 8 Tablets. Qty: 20 Tablet, Refills: 0  Start date: 3/31/2022, End date: 4/4/2022    Associated Diagnoses: Small bowel obstruction (HCC)         STOP taking these medications       levothyroxine (Synthroid) 112 mcg tablet Comments:   Reason for Stopping:               * Follow-up Care/Patient Instructions: Activity: Activity as tolerated  Diet: Regular Diet and gluten free diet  Wound Care: remove bandages in 2 days or if guaze gets wet before then.      Follow-up Information     Follow up With Specialties Details Why Contact Info    Kumar Rodriguez MD General Surgery In 2 weeks For wound re-check [address]  [address]      [address] [facility] [address] [phone]          Follow-up tests/labs: none    Signed:  Sena Tubbs MD  4/2/2022  6:14 AM

## 2022-04-13 ENCOUNTER — OFFICE VISIT (OUTPATIENT)
Dept: SURGERY | Age: 77
End: 2022-04-13

## 2022-04-13 VITALS
RESPIRATION RATE: 16 BRPM | DIASTOLIC BLOOD PRESSURE: 79 MMHG | BODY MASS INDEX: 27.71 KG/M2 | OXYGEN SATURATION: 97 % | SYSTOLIC BLOOD PRESSURE: 121 MMHG | HEART RATE: 60 BPM | HEIGHT: 63 IN | WEIGHT: 156.4 LBS | TEMPERATURE: 97.9 F

## 2022-04-13 DIAGNOSIS — K56.52 INTESTINAL ADHESIONS WITH COMPLETE OBSTRUCTION (HCC): Primary | ICD-10-CM

## 2022-04-13 PROCEDURE — 99024 POSTOP FOLLOW-UP VISIT: CPT | Performed by: SURGERY

## 2022-04-13 RX ORDER — CHOLECALCIFEROL (VITAMIN D3) 125 MCG
CAPSULE ORAL
COMMUNITY

## 2022-04-13 NOTE — PROGRESS NOTES
HPI     DLS: 10/17/19    Pt here for HRT review;    Meds;   Pres Free AT's  QID OU     1) POAG   2) PCIOL OU   3) Type 2 DM NO DR   4) LOLA   5) PVD OU   6) Yag Cap OD     Last edited by Tila Kent on 2/7/2020  9:43 AM. (History)            Assessment /Plan     For exam results, see Encounter Report.    Primary open angle glaucoma of left eye, moderate stage    Primary open angle glaucoma of right eye, severe stage    Dry eyes, bilateral    Type 2 diabetes mellitus without ophthalmic manifestations    Posterior vitreous detachment of both eyes - Both Eyes    PCO (posterior capsular opacification), left    PCO (posterior capsular opacification), right    Pseudophakia of both eyes    Glaucoma filtering bleb of both eyes            1. POAG (primary open-angle glaucoma) - Severe stage - Both Eyes   Glaucoma (type and duration) POAG,   -  first HVF 1997  -  first photo: 1996     Family history None   Glaucoma meds - (off all gtts os post combined)  (( use to use - Alphagan od , Xalatan od , dorzolamide bid od ))  H/O adverse rxn to glaucoma drops into to BB 2/2 asthma   LASERS SLT 12/8/05 and repeat 7/16/09 OD, and SLT 1/6/06 OS;  SLT os 7/31/14, od 8/14/14 (no response ou) // yag cap od 1/3/2019 /// yag cap os  10/17/2019   GLAUCOMA SURGERIES - combined phaco/IOL trab -os 12/28/2015// elastic sclera - 9 sutures to close - all visible ones cut /// combined - phaco/trab w/ minishunt od - 3/30/2016  OTHER EYE SURGERIES PC IOL OS (combined 1/28/2015) // PC IOL od (combined 3/30/2016)  CDR 0.95 OU   Tbase 22 OU   Tmax 37 (4/11/2016) /38 ( 4/16/2015)   Ttarget 14/14  HVF 21 HVF: 4803-4096 - SAD and  INS  OD, // SAD / IAD  os  Gonio +3 OU   /421   OCT 9 test 2004 - 2019: Dec S/I/T OD// Dec thru out   HRT 13 test 2005 to 2020 - MR- Dec. SN/N, bord IT od // dec. N/IN, bord SN/IT os /// CDR 0.78 od // 0.75 os  Disc photos 1996, 1997, 2003: slides // 2012 , 2015 - OIS    Ttoday  15/15  Test done today: IOP / HRT      2.  Patient here for follow-up of her laparoscopic lysis of adhesions. She states she is feeling great. She has not required any pain medicine. She is back to regular diet. Visit Vitals  /79 (BP 1 Location: Left arm, BP Patient Position: Sitting, BP Cuff Size: Adult)   Pulse 60   Temp 97.9 °F (36.6 °C)   Resp 16   Ht 5' 3\" (1.6 m)   Wt 70.9 kg (156 lb 6.4 oz)   SpO2 97%   BMI 27.71 kg/m²     General alert in no acute distress  Abdomen soft nontender nondistended incisions healing well without evidence of infection    Status post laparoscopic lysis of adhesion  Doing well  Continue to limit physical activity for another few weeks.   Follow-up as needed Diabetes mellitus type II   No BDR     3. Dry eyes - Both Eyes   AT's prn     4. Posterior vitreous detachment of both eyes - Both Eyes   With floaters - stable     5. A lot of family stressors   Mom passed since last visit  dad elderly and in poor health, have sitters  Daughter is unemployed and had to move back home  Daughter recently ill - in ICU and intubated 2/2 pancreatitis (8/2/2015 to 8/11/2015)      6. S/P yag cap ou   -od 10/3/3019 (also Rx ant capsule phimosis od)   -OS - 10/17/2019        Plan  Glaucoma   Stay off  all glaucoma gtts OU for now - can add back if IOP goes above target of 14 ou   IOP OK ou off all gtts post trabs ou // OD w/mini shunt // OS without mini shunt (very elastic sclera)     Cont AT's ou prn     Glasses - heidi 8/25/2016 - pt is interested in new glasses - refer back to him - may be able to improve post yag cap ou     AT's prn     F/u with me - 3-4 months w/ HVF / DFE / OCT    If any VF progression / or if IOP above 14 again next visit can add latanoprost back

## 2022-04-13 NOTE — PROGRESS NOTES
Pt here for 2 week surgical follow up for laparoscopy and lysis of adhesions. 1. Have you been to the ER, urgent care clinic since your last visit? Hospitalized since your last visit? No    2. Have you seen or consulted any other health care providers outside of the [address] [place] [address] since your last visit? Include any pap smears or colon screening.  No

## (undated) DEVICE — GARMENT,MEDLINE,DVT,INT,CALF,MED, GEN2: Brand: MEDLINE

## (undated) DEVICE — GENERAL LAPAROSCOPY - SMH: Brand: MEDLINE INDUSTRIES, INC.

## (undated) DEVICE — Device

## (undated) DEVICE — TOTAL TRAY, DB, 100% SILI FOLEY, 16FR 10: Brand: MEDLINE

## (undated) DEVICE — TROCAR: Brand: KII® OPTICAL ACCESS SYSTEM

## (undated) DEVICE — GLOVE ORANGE PI 7 1/2   MSG9075

## (undated) DEVICE — SUTURE MCRYL SZ 4-0 L27IN ABSRB UD L19MM PS-2 1/2 CIR PRIM Y426H

## (undated) DEVICE — TROCAR: Brand: KII® SLEEVE

## (undated) DEVICE — 3M™ TEGADERM™ TRANSPARENT FILM DRESSING FRAME STYLE, 1624W, 2-3/8 IN X 2-3/4 IN (6 CM X 7 CM), 100/CT 4CT/CASE: Brand: 3M™ TEGADERM™

## (undated) DEVICE — GLOVE SURG SZ 65 L12IN FNGR THK94MIL STD WHT LTX FREE

## (undated) DEVICE — TROCAR: Brand: KII FIOS FIRST ENTRY

## (undated) DEVICE — 4-PORT MANIFOLD: Brand: NEPTUNE 2

## (undated) DEVICE — HYPODERMIC SAFETY NEEDLE: Brand: MAGELLAN

## (undated) DEVICE — GAUZE,SPONGE,2"X2",8PLY,STERILE,LF,2'S: Brand: MEDLINE

## (undated) DEVICE — MASTISOL ADHESIVE LIQ 2/3ML